# Patient Record
Sex: MALE | Race: BLACK OR AFRICAN AMERICAN | NOT HISPANIC OR LATINO | Employment: FULL TIME | URBAN - METROPOLITAN AREA
[De-identification: names, ages, dates, MRNs, and addresses within clinical notes are randomized per-mention and may not be internally consistent; named-entity substitution may affect disease eponyms.]

---

## 2017-06-23 ENCOUNTER — HOSPITAL ENCOUNTER (EMERGENCY)
Facility: HOSPITAL | Age: 27
Discharge: HOME | End: 2017-06-23
Attending: EMERGENCY MEDICINE
Payer: MEDICAID

## 2017-06-23 VITALS
BODY MASS INDEX: 19.64 KG/M2 | HEART RATE: 71 BPM | HEIGHT: 72 IN | SYSTOLIC BLOOD PRESSURE: 131 MMHG | RESPIRATION RATE: 16 BRPM | DIASTOLIC BLOOD PRESSURE: 74 MMHG | TEMPERATURE: 97 F | OXYGEN SATURATION: 100 % | WEIGHT: 145 LBS

## 2017-06-23 DIAGNOSIS — R11.10 VOMITING: ICD-10-CM

## 2017-06-23 DIAGNOSIS — K21.9 GASTROESOPHAGEAL REFLUX DISEASE, ESOPHAGITIS PRESENCE NOT SPECIFIED: Primary | ICD-10-CM

## 2017-06-23 LAB
ALBUMIN SERPL BCP-MCNC: 4.9 G/DL
ALP SERPL-CCNC: 96 U/L
ALT SERPL W/O P-5'-P-CCNC: 19 U/L
ANION GAP SERPL CALC-SCNC: 12 MMOL/L
AST SERPL-CCNC: 25 U/L
BASOPHILS # BLD AUTO: 0 K/UL
BASOPHILS NFR BLD: 0.3 %
BILIRUB SERPL-MCNC: 1.3 MG/DL
BUN SERPL-MCNC: 17 MG/DL
CALCIUM SERPL-MCNC: 10.4 MG/DL
CHLORIDE SERPL-SCNC: 95 MMOL/L
CO2 SERPL-SCNC: 29 MMOL/L
CREAT SERPL-MCNC: 1 MG/DL
DIFFERENTIAL METHOD: ABNORMAL
EOSINOPHIL # BLD AUTO: 0 K/UL
EOSINOPHIL NFR BLD: 0.7 %
ERYTHROCYTE [DISTWIDTH] IN BLOOD BY AUTOMATED COUNT: 11.9 %
EST. GFR  (AFRICAN AMERICAN): >60 ML/MIN/1.73 M^2
EST. GFR  (NON AFRICAN AMERICAN): >60 ML/MIN/1.73 M^2
GLUCOSE SERPL-MCNC: 97 MG/DL
HCT VFR BLD AUTO: 44 %
HGB BLD-MCNC: 15 G/DL
LIPASE SERPL-CCNC: 13 U/L
LYMPHOCYTES # BLD AUTO: 1.6 K/UL
LYMPHOCYTES NFR BLD: 26.7 %
MCH RBC QN AUTO: 33.9 PG
MCHC RBC AUTO-ENTMCNC: 34.2 %
MCV RBC AUTO: 99 FL
MONOCYTES # BLD AUTO: 0.9 K/UL
MONOCYTES NFR BLD: 14.2 %
NEUTROPHILS # BLD AUTO: 3.6 K/UL
NEUTROPHILS NFR BLD: 58.1 %
PLATELET # BLD AUTO: 188 K/UL
PMV BLD AUTO: 7.3 FL
POTASSIUM SERPL-SCNC: 3.9 MMOL/L
PROT SERPL-MCNC: 9.2 G/DL
RBC # BLD AUTO: 4.43 M/UL
SODIUM SERPL-SCNC: 136 MMOL/L
WBC # BLD AUTO: 6.1 K/UL

## 2017-06-23 PROCEDURE — 83690 ASSAY OF LIPASE: CPT

## 2017-06-23 PROCEDURE — 80053 COMPREHEN METABOLIC PANEL: CPT

## 2017-06-23 PROCEDURE — 25000003 PHARM REV CODE 250: Performed by: PHYSICIAN ASSISTANT

## 2017-06-23 PROCEDURE — 36415 COLL VENOUS BLD VENIPUNCTURE: CPT

## 2017-06-23 PROCEDURE — 99284 EMERGENCY DEPT VISIT MOD MDM: CPT | Mod: 25

## 2017-06-23 PROCEDURE — 85025 COMPLETE CBC W/AUTO DIFF WBC: CPT

## 2017-06-23 PROCEDURE — 96360 HYDRATION IV INFUSION INIT: CPT

## 2017-06-23 PROCEDURE — 93005 ELECTROCARDIOGRAM TRACING: CPT

## 2017-06-23 RX ORDER — ONDANSETRON 4 MG/1
8 TABLET, FILM COATED ORAL 2 TIMES DAILY
COMMUNITY
End: 2018-04-11

## 2017-06-23 RX ORDER — CALCIUM CARBONATE 200(500)MG
500 TABLET,CHEWABLE ORAL
Status: COMPLETED | OUTPATIENT
Start: 2017-06-23 | End: 2017-06-23

## 2017-06-23 RX ORDER — FAMOTIDINE 20 MG/1
20 TABLET, FILM COATED ORAL 2 TIMES DAILY
COMMUNITY
End: 2018-04-11

## 2017-06-23 RX ORDER — ACETAMINOPHEN 325 MG/1
325 TABLET ORAL EVERY 6 HOURS PRN
COMMUNITY
End: 2018-04-11

## 2017-06-23 RX ORDER — BISMUTH SUBSALICYLATE 525 MG/30ML
15 LIQUID ORAL EVERY 6 HOURS PRN
COMMUNITY
End: 2018-04-11

## 2017-06-23 RX ADMIN — CALCIUM CARBONATE (ANTACID) CHEW TAB 500 MG 500 MG: 500 CHEW TAB at 12:06

## 2017-06-23 RX ADMIN — SODIUM CHLORIDE 1000 ML: 0.9 INJECTION, SOLUTION INTRAVENOUS at 11:06

## 2017-06-23 NOTE — ED PROVIDER NOTES
"Encounter Date: 6/23/2017       History     Chief Complaint   Patient presents with    Abdominal Pain     epigastric pain x 4 days     Vomiting    Nausea     Patient is a 26 year old male with complaint of indigestion. He denied PMH. He reports he started vomiting on Monday with associated abdominal pain. He went to [facility] at that time and reports he was given an "IV and some medicine and I felt a little better". He states the vomiting returned on Tuesday and he returned to [facility] and was admitted over night. He states he has had no vomiting since Tuesday but continues to have "indigestion, it feels like it's coming back up when I eat". He reports when he takes pepto-bismol it relieves his symptoms. He denied further abdominal pain, fever, chills or change in bowel habits.       The history is provided by the patient.     Review of patient's allergies indicates:  No Known Allergies  History reviewed. No pertinent past medical history.  History reviewed. No pertinent surgical history.  History reviewed. No pertinent family history.  Social History   Substance Use Topics    Smoking status: Current Some Day Smoker     Types: Cigarettes    Smokeless tobacco: Not on file    Alcohol use No     Review of Systems   Constitutional: Negative for chills and fever.   HENT: Negative for congestion and sore throat.    Respiratory: Negative for cough and shortness of breath.    Cardiovascular: Negative for chest pain.   Gastrointestinal: Positive for vomiting (Resolved). Negative for abdominal pain, diarrhea and nausea.   Genitourinary: Negative for dysuria.   Musculoskeletal: Negative for back pain.   Skin: Negative for rash.   Neurological: Negative for dizziness, weakness and headaches.   Hematological: Does not bruise/bleed easily.       Physical Exam     Initial Vitals [06/23/17 1001]   BP Pulse Resp Temp SpO2   137/79 73 18 97.4 °F (36.3 °C) 97 %      MAP       98.33         Physical Exam    Nursing note and vitals " "reviewed.  Constitutional: He appears well-developed and well-nourished.   HENT:   Head: Normocephalic and atraumatic.   Right Ear: External ear normal.   Left Ear: External ear normal.   Nose: Nose normal.   Eyes: Conjunctivae are normal. Right eye exhibits no discharge. Left eye exhibits no discharge. No scleral icterus.   Neck: Normal range of motion. Neck supple.   Cardiovascular: Normal rate, regular rhythm and normal heart sounds. Exam reveals no gallop and no friction rub.    No murmur heard.  Pulmonary/Chest: Breath sounds normal. He has no wheezes. He has no rhonchi. He has no rales.   Abdominal: Soft. Normal appearance and bowel sounds are normal. He exhibits no distension. There is no tenderness. There is no rigidity, no rebound and no guarding.   Musculoskeletal: Normal range of motion. He exhibits no tenderness.   Neurological: He is oriented to person, place, and time.   Skin: Skin is warm and dry.         ED Course   Procedures  Labs Reviewed   CBC W/ AUTO DIFFERENTIAL - Abnormal; Notable for the following:        Result Value    RBC 4.43 (*)     MCV 99 (*)     MCH 33.9 (*)     MPV 7.3 (*)     All other components within normal limits   COMPREHENSIVE METABOLIC PANEL - Abnormal; Notable for the following:     Total Protein 9.2 (*)     Total Bilirubin 1.3 (*)     All other components within normal limits   LIPASE             Medical Decision Making:   History:   Old Medical Records: I decided to obtain old medical records.  Clinical Tests:   Lab Tests: Ordered  Medical Tests: Ordered       APC / Resident Notes:   This is an emergent evaluation of a 26 year old male with complaint of "indigestion". This is his third ED visit in one week with a reported recent admission at [facility]. He reports his vomiting has resolved. He is well appearing. Vital signs are stable. Moist mucous membranes. His abdomen is soft and non-tender. There is no rebound or guarding. I doubt acute intra-abdominal process. He reports " pepto-bismol relieves his symptoms and his HPI is consistent with GERD. Labs are unremarkable. Lipase is negative. He was given IVF hydration and TUMS with resolution of his symptoms. He is instructed to continue his Prilosec as prescribed. Strongly encouraged patient to follow up with GI. Discussed results with patient. Return precautions given. Patient is to follow up with their primary care provider. Case was discussed with Dr. Medeiros who has evaluated the patient and is in agreement with the plan of care. All questions answered.                 ED Course   Comment By Time   EKG:  NSR, rate of 66, normal intervals and axis.  There are no acute ST or T wave changes suggestive of acute ischemia or infarction. Anthony Medeiros MD 06/23 3642     Clinical Impression:   The primary encounter diagnosis was Gastroesophageal reflux disease, esophagitis presence not specified. A diagnosis of Vomiting was also pertinent to this visit.                           Jackie Pink PA-C  06/23/17 0638

## 2017-06-23 NOTE — DISCHARGE INSTRUCTIONS
Continue Prilosec as prescribed.  Make an appointment with your primary care provider and stomach doctor.  For worsening symptoms, chest pain, shortness of breath, increased abdominal pain, high grade fever, stroke or stroke like symptoms, immediately go to the nearest Emergency Room or call 911 as soon as possible.

## 2018-05-09 ENCOUNTER — HOSPITAL ENCOUNTER (EMERGENCY)
Facility: HOSPITAL | Age: 28
Discharge: HOME | End: 2018-05-09
Attending: EMERGENCY MEDICINE
Payer: MEDICAID

## 2018-05-09 VITALS
HEIGHT: 73 IN | OXYGEN SATURATION: 98 % | HEART RATE: 86 BPM | SYSTOLIC BLOOD PRESSURE: 128 MMHG | TEMPERATURE: 98 F | DIASTOLIC BLOOD PRESSURE: 76 MMHG | RESPIRATION RATE: 16 BRPM | BODY MASS INDEX: 19.22 KG/M2 | WEIGHT: 145 LBS

## 2018-05-09 DIAGNOSIS — E86.0 DEHYDRATION: Primary | ICD-10-CM

## 2018-05-09 DIAGNOSIS — R11.2 NON-INTRACTABLE VOMITING WITH NAUSEA, UNSPECIFIED VOMITING TYPE: ICD-10-CM

## 2018-05-09 DIAGNOSIS — N17.9 AKI (ACUTE KIDNEY INJURY): ICD-10-CM

## 2018-05-09 LAB
ALBUMIN SERPL BCP-MCNC: 5.5 G/DL
ALP SERPL-CCNC: 149 U/L
ALT SERPL W/O P-5'-P-CCNC: 26 U/L
ANION GAP SERPL CALC-SCNC: 17 MMOL/L
AST SERPL-CCNC: 22 U/L
BASOPHILS # BLD AUTO: 0 K/UL
BASOPHILS NFR BLD: 0.1 %
BILIRUB SERPL-MCNC: 0.8 MG/DL
BUN SERPL-MCNC: 64 MG/DL
CALCIUM SERPL-MCNC: 10.5 MG/DL
CHLORIDE SERPL-SCNC: 91 MMOL/L
CO2 SERPL-SCNC: 22 MMOL/L
CREAT SERPL-MCNC: 2 MG/DL
DEPRECATED S PYO AG THROAT QL EIA: NEGATIVE
DIFFERENTIAL METHOD: ABNORMAL
EOSINOPHIL # BLD AUTO: 0 K/UL
EOSINOPHIL NFR BLD: 0.2 %
ERYTHROCYTE [DISTWIDTH] IN BLOOD BY AUTOMATED COUNT: 12.7 %
EST. GFR  (AFRICAN AMERICAN): 52 ML/MIN/1.73 M^2
EST. GFR  (NON AFRICAN AMERICAN): 45 ML/MIN/1.73 M^2
GLUCOSE SERPL-MCNC: 105 MG/DL
HCT VFR BLD AUTO: 50.6 %
HETEROPH AB SERPL QL IA: NEGATIVE
HGB BLD-MCNC: 17.3 G/DL
HIV1+2 IGG SERPL QL IA.RAPID: NEGATIVE
LYMPHOCYTES # BLD AUTO: 1.3 K/UL
LYMPHOCYTES NFR BLD: 20.1 %
MAGNESIUM SERPL-MCNC: 2.9 MG/DL
MCH RBC QN AUTO: 33.6 PG
MCHC RBC AUTO-ENTMCNC: 34.3 G/DL
MCV RBC AUTO: 98 FL
MONOCYTES # BLD AUTO: 0.7 K/UL
MONOCYTES NFR BLD: 10 %
NEUTROPHILS # BLD AUTO: 4.6 K/UL
NEUTROPHILS NFR BLD: 69.6 %
PHOSPHATE SERPL-MCNC: 7.3 MG/DL
PLATELET # BLD AUTO: 213 K/UL
PMV BLD AUTO: 7.6 FL
POTASSIUM SERPL-SCNC: 4.2 MMOL/L
PROT SERPL-MCNC: 10.2 G/DL
RBC # BLD AUTO: 5.15 M/UL
SODIUM SERPL-SCNC: 130 MMOL/L
WBC # BLD AUTO: 6.6 K/UL

## 2018-05-09 PROCEDURE — 25000003 PHARM REV CODE 250: Performed by: EMERGENCY MEDICINE

## 2018-05-09 PROCEDURE — 83735 ASSAY OF MAGNESIUM: CPT

## 2018-05-09 PROCEDURE — 80053 COMPREHEN METABOLIC PANEL: CPT

## 2018-05-09 PROCEDURE — 86308 HETEROPHILE ANTIBODY SCREEN: CPT

## 2018-05-09 PROCEDURE — 96361 HYDRATE IV INFUSION ADD-ON: CPT

## 2018-05-09 PROCEDURE — 87081 CULTURE SCREEN ONLY: CPT

## 2018-05-09 PROCEDURE — 87880 STREP A ASSAY W/OPTIC: CPT

## 2018-05-09 PROCEDURE — 85025 COMPLETE CBC W/AUTO DIFF WBC: CPT

## 2018-05-09 PROCEDURE — 84100 ASSAY OF PHOSPHORUS: CPT

## 2018-05-09 PROCEDURE — 99284 EMERGENCY DEPT VISIT MOD MDM: CPT | Mod: 25

## 2018-05-09 PROCEDURE — 86703 HIV-1/HIV-2 1 RESULT ANTBDY: CPT

## 2018-05-09 PROCEDURE — 36415 COLL VENOUS BLD VENIPUNCTURE: CPT

## 2018-05-09 PROCEDURE — 63600175 PHARM REV CODE 636 W HCPCS: Performed by: EMERGENCY MEDICINE

## 2018-05-09 PROCEDURE — 96374 THER/PROPH/DIAG INJ IV PUSH: CPT

## 2018-05-09 RX ORDER — ACETAMINOPHEN 325 MG/1
650 TABLET ORAL
Status: COMPLETED | OUTPATIENT
Start: 2018-05-09 | End: 2018-05-09

## 2018-05-09 RX ORDER — OMEPRAZOLE 20 MG/1
20 CAPSULE, DELAYED RELEASE ORAL DAILY
Qty: 20 CAPSULE | Refills: 0 | Status: SHIPPED | OUTPATIENT
Start: 2018-05-09 | End: 2019-08-02

## 2018-05-09 RX ORDER — ONDANSETRON 2 MG/ML
4 INJECTION INTRAMUSCULAR; INTRAVENOUS
Status: COMPLETED | OUTPATIENT
Start: 2018-05-09 | End: 2018-05-09

## 2018-05-09 RX ORDER — ONDANSETRON 4 MG/1
4 TABLET, ORALLY DISINTEGRATING ORAL EVERY 6 HOURS PRN
Qty: 12 TABLET | Refills: 0 | Status: ON HOLD | OUTPATIENT
Start: 2018-05-09 | End: 2019-07-13 | Stop reason: SDUPTHER

## 2018-05-09 RX ADMIN — LIDOCAINE HYDROCHLORIDE: 20 SOLUTION ORAL; TOPICAL at 02:05

## 2018-05-09 RX ADMIN — SODIUM CHLORIDE 1000 ML: 0.9 INJECTION, SOLUTION INTRAVENOUS at 02:05

## 2018-05-09 RX ADMIN — ONDANSETRON 4 MG: 2 INJECTION INTRAMUSCULAR; INTRAVENOUS at 01:05

## 2018-05-09 RX ADMIN — SODIUM CHLORIDE 1000 ML: 0.9 INJECTION, SOLUTION INTRAVENOUS at 01:05

## 2018-05-09 RX ADMIN — ACETAMINOPHEN 650 MG: 325 TABLET, FILM COATED ORAL at 01:05

## 2018-05-09 NOTE — ED PROVIDER NOTES
Encounter Date: 5/9/2018    SCRIBE #1 NOTE: ISmitha, am scribing for, and in the presence of, Dr. Dixon.       History     Chief Complaint   Patient presents with    Fatigue     x 1 week     Laryngitis    Generalized Body Aches     5/9/2018  12:15 PM     Chief Complaint: Sore throat    The patient is a 27 y.o. male with PMHx of GERD who presents with sore throat. Patient c/o sudden onset of store throat that started yesterday. He also reports fatigue, generalized body aches, alternating chills and subjective fever. Pt had vomiting a few days ago and is currently nauseated. He denies any fevers, diarrhea or abdominal pain. Denies any IV drug usage. He is sexually active with one female partner but does not use any contraceptives. The patient recently traveled to [place] and reports his symptoms started soon after. No shx.      The history is provided by the patient.     Review of patient's allergies indicates:  No Known Allergies  Past Medical History:   Diagnosis Date    GERD (gastroesophageal reflux disease)      History reviewed. No pertinent surgical history.  History reviewed. No pertinent family history.  Social History   Substance Use Topics    Smoking status: Current Every Day Smoker    Smokeless tobacco: Not on file    Alcohol use No     Review of Systems   Constitutional: Positive for chills, fatigue and fever (subjective). Negative for appetite change.   HENT: Positive for sore throat. Negative for congestion and rhinorrhea.    Respiratory: Negative for cough and shortness of breath.    Cardiovascular: Negative for chest pain.   Gastrointestinal: Negative for abdominal pain, diarrhea, nausea and vomiting.   Genitourinary: Negative for dysuria.   Musculoskeletal: Positive for myalgias. Negative for back pain.   Skin: Negative for rash.   Neurological: Negative for weakness and numbness.   Hematological: Does not bruise/bleed easily.   All other systems reviewed and are  negative.      Physical Exam     Initial Vitals [05/09/18 1110]   BP Pulse Resp Temp SpO2   (!) 140/87 103 18 98.4 °F (36.9 °C) 99 %      MAP       104.67         Physical Exam    Nursing note and vitals reviewed.  Constitutional: He is diaphoretic. He appears ill. No distress.   Thin, ill appearing.    HENT:   Head: Normocephalic and atraumatic.   Mouth/Throat: Uvula is midline. Mucous membranes are dry. Posterior oropharyngeal erythema present.   Cobblestone of the posterior oropharynx with erythema. No tonsillar enlargement. No tonsillar exudate.   Eyes: EOM are normal. Pupils are equal, round, and reactive to light.   Neck: Normal range of motion.   Cardiovascular: Regular rhythm, normal heart sounds, intact distal pulses and normal pulses. Tachycardia present.  Exam reveals no gallop and no friction rub.    No murmur heard.  Pulses:       Radial pulses are 2+ on the right side, and 2+ on the left side.   Mildly tachycardiac.   Pulmonary/Chest: No stridor. He has no wheezes. He has rhonchi. He has no rales.   Rhonchi to the left lung.   Abdominal: Soft. He exhibits no distension. There is no tenderness.   Musculoskeletal: Normal range of motion. He exhibits no edema.   Neurological: He is alert and oriented to person, place, and time. He has normal strength.   Skin: No rash noted.   Psychiatric: He has a normal mood and affect.         ED Course   Procedures  Labs Reviewed   CBC W/ AUTO DIFFERENTIAL - Abnormal; Notable for the following:        Result Value    MCH 33.6 (*)     MPV 7.6 (*)     All other components within normal limits   COMPREHENSIVE METABOLIC PANEL - Abnormal; Notable for the following:     Sodium 130 (*)     Chloride 91 (*)     CO2 22 (*)     BUN, Bld 64 (*)     Creatinine 2.0 (*)     Total Protein 10.2 (*)     Albumin 5.5 (*)     Alkaline Phosphatase 149 (*)     Anion Gap 17 (*)     eGFR if  52 (*)     eGFR if non  45 (*)     All other components within normal  limits   MAGNESIUM - Abnormal; Notable for the following:     Magnesium 2.9 (*)     All other components within normal limits   PHOSPHORUS - Abnormal; Notable for the following:     Phosphorus 7.3 (*)     All other components within normal limits   THROAT SCREEN, RAPID   CULTURE, STREP A,  THROAT   HETEROPHILE AB SCREEN   RAPID HIV     Imaging Results          X-Ray Chest PA And Lateral (Final result)  Result time 05/09/18 13:01:37    Final result by Danny Slater MD (05/09/18 13:01:37)                 Impression:      Negative chest.      Electronically signed by: Danny Slater MD  Date:    05/09/2018  Time:    13:01             Narrative:    EXAMINATION:  XR CHEST PA AND LATERAL    CLINICAL HISTORY:  fatigue;    TECHNIQUE:  PA and lateral views of the chest were performed.    COMPARISON:  None    FINDINGS:  The cardiomediastinal silhouette is with normal limits.  Lungs are well expanded without consolidation or pleural effusion.                                          Medical Decision Making:   History:   Old Medical Records: I decided to obtain old medical records.  Initial Assessment:   Patient is a 27-year-old male who presents emergency department complaining nausea vomiting, sore throat and fatigue.  He has had these symptoms in the past and has to get IV fluids from time to time.  Sometimes he manages at home.  He denies any recent sick contacts.  His mucous membrane is dry. Laboratory evaluation reveals evidence of dehydration with acute kidney injury. GFR decreased from greater than 60-52 and his creatinine went from 1-2 with elevated BUN of 64.  There is hyponatremia with hypochloremia.  Chest x-ray shows no evidence of pneumonia.  Rapid HIV, Monospot and strep test were negative. Neal sore throat is likely secondary to his vomiting and his fatigue is secondary to his dehydration.  He is given antiemetics, 2 L of IV fluids and GI cocktail with significant improvement.  Patient feels well  enough to go home.  I will discharge him on antiemetics and diet suggestions.  He has no leukocytosis or fever to suspect bacterial infectious etiology.  Do not believe he needs antibiotics.  I have low suspicion for acute diverticulitis, acute pancreatitis, acute cholecystitis.  Return precautions were discussed.  Encouraged to follow up with PCP in 1 week.  He is discharged improved and in no acute distress.  Clinical Tests:   Lab Tests: Ordered and Reviewed  Radiological Study: Ordered and Reviewed            Scribe Attestation:   Scribe #1: I performed the above scribed service and the documentation accurately describes the services I performed. I attest to the accuracy of the note.      I, Zack River, personally performed the services described in this documentation. All medical record entries made by the scribe were at my direction and in my presence.  I have reviewed the chart and agree that the record reflects my personal performance and is accurate and complete. Leonel Dixon MD.  4:09 PM 05/09/2018               Clinical Impression:   The primary encounter diagnosis was Dehydration. Diagnoses of Non-intractable vomiting with nausea, unspecified vomiting type and SEMAJ (acute kidney injury) were also pertinent to this visit.    Disposition:   Disposition: Discharged  Condition: Stable                        Leonel Dixon MD  05/09/18 9670

## 2018-05-12 LAB — BACTERIA THROAT CULT: NORMAL

## 2019-07-12 ENCOUNTER — HOSPITAL ENCOUNTER (OUTPATIENT)
Facility: HOSPITAL | Age: 29
Discharge: HOME | End: 2019-07-13
Attending: EMERGENCY MEDICINE | Admitting: INTERNAL MEDICINE
Payer: MEDICAID

## 2019-07-12 DIAGNOSIS — E86.0 DEHYDRATION: ICD-10-CM

## 2019-07-12 DIAGNOSIS — N17.9 AKI (ACUTE KIDNEY INJURY): Primary | ICD-10-CM

## 2019-07-12 DIAGNOSIS — N30.01 ACUTE CYSTITIS WITH HEMATURIA: ICD-10-CM

## 2019-07-12 DIAGNOSIS — K52.9 GASTROENTERITIS: ICD-10-CM

## 2019-07-12 LAB
BASOPHILS # BLD AUTO: 0.04 K/UL (ref 0–0.2)
BASOPHILS NFR BLD: 0.2 % (ref 0–1.9)
DIFFERENTIAL METHOD: ABNORMAL
EOSINOPHIL # BLD AUTO: 0 K/UL (ref 0–0.5)
EOSINOPHIL NFR BLD: 0 % (ref 0–8)
ERYTHROCYTE [DISTWIDTH] IN BLOOD BY AUTOMATED COUNT: 11.5 % (ref 11.5–14.5)
HCT VFR BLD AUTO: 44.4 % (ref 40–54)
HGB BLD-MCNC: 15 G/DL (ref 14–18)
IMM GRANULOCYTES # BLD AUTO: 0.08 K/UL (ref 0–0.04)
LYMPHOCYTES # BLD AUTO: 0.9 K/UL (ref 1–4.8)
LYMPHOCYTES NFR BLD: 5 % (ref 18–48)
MCH RBC QN AUTO: 32.6 PG (ref 27–31)
MCHC RBC AUTO-ENTMCNC: 33.8 G/DL (ref 32–36)
MCV RBC AUTO: 97 FL (ref 82–98)
MONOCYTES # BLD AUTO: 0.7 K/UL (ref 0.3–1)
MONOCYTES NFR BLD: 3.8 % (ref 4–15)
NEUTROPHILS # BLD AUTO: 16.2 K/UL (ref 1.8–7.7)
NEUTROPHILS NFR BLD: 90.6 % (ref 38–73)
NRBC BLD-RTO: 0 /100 WBC
PLATELET # BLD AUTO: 217 K/UL (ref 150–350)
PMV BLD AUTO: 9.6 FL (ref 9.2–12.9)
RBC # BLD AUTO: 4.6 M/UL (ref 4.6–6.2)
WBC # BLD AUTO: 17.92 K/UL (ref 3.9–12.7)

## 2019-07-12 PROCEDURE — 85025 COMPLETE CBC W/AUTO DIFF WBC: CPT

## 2019-07-12 PROCEDURE — 99285 EMERGENCY DEPT VISIT HI MDM: CPT | Mod: 25

## 2019-07-12 PROCEDURE — 25000003 PHARM REV CODE 250: Performed by: NURSE PRACTITIONER

## 2019-07-12 PROCEDURE — 83690 ASSAY OF LIPASE: CPT

## 2019-07-12 PROCEDURE — 96361 HYDRATE IV INFUSION ADD-ON: CPT

## 2019-07-12 PROCEDURE — 80053 COMPREHEN METABOLIC PANEL: CPT

## 2019-07-12 RX ORDER — DICYCLOMINE HYDROCHLORIDE 10 MG/1
20 CAPSULE ORAL
Status: COMPLETED | OUTPATIENT
Start: 2019-07-12 | End: 2019-07-12

## 2019-07-12 RX ORDER — SODIUM CHLORIDE 9 MG/ML
1000 INJECTION, SOLUTION INTRAVENOUS
Status: COMPLETED | OUTPATIENT
Start: 2019-07-12 | End: 2019-07-13

## 2019-07-12 RX ORDER — ONDANSETRON HYDROCHLORIDE 4 MG/5ML
4 SOLUTION ORAL ONCE
Status: COMPLETED | OUTPATIENT
Start: 2019-07-13 | End: 2019-07-12

## 2019-07-12 RX ADMIN — ONDANSETRON 4 MG: 4 SOLUTION ORAL at 11:07

## 2019-07-12 RX ADMIN — SODIUM CHLORIDE 1000 ML: 0.9 INJECTION, SOLUTION INTRAVENOUS at 11:07

## 2019-07-12 RX ADMIN — DICYCLOMINE HYDROCHLORIDE 20 MG: 10 CAPSULE ORAL at 11:07

## 2019-07-13 VITALS
RESPIRATION RATE: 18 BRPM | TEMPERATURE: 99 F | HEIGHT: 73 IN | WEIGHT: 145 LBS | SYSTOLIC BLOOD PRESSURE: 154 MMHG | HEART RATE: 56 BPM | DIASTOLIC BLOOD PRESSURE: 93 MMHG | BODY MASS INDEX: 19.22 KG/M2 | OXYGEN SATURATION: 99 %

## 2019-07-13 PROBLEM — N17.9 AKI (ACUTE KIDNEY INJURY): Status: ACTIVE | Noted: 2019-07-13

## 2019-07-13 PROBLEM — E86.0 DEHYDRATION: Status: ACTIVE | Noted: 2019-07-13

## 2019-07-13 PROBLEM — N39.0 UTI (URINARY TRACT INFECTION): Status: ACTIVE | Noted: 2019-07-13

## 2019-07-13 PROBLEM — Z72.0 TOBACCO ABUSE: Status: ACTIVE | Noted: 2019-07-13

## 2019-07-13 PROBLEM — K52.9 GASTROENTERITIS: Status: ACTIVE | Noted: 2019-07-13

## 2019-07-13 PROBLEM — R65.20 SEVERE SEPSIS: Status: ACTIVE | Noted: 2019-07-13

## 2019-07-13 PROBLEM — A41.9 SEVERE SEPSIS: Status: ACTIVE | Noted: 2019-07-13

## 2019-07-13 LAB
ALBUMIN SERPL BCP-MCNC: 5.9 G/DL (ref 3.5–5.2)
ALP SERPL-CCNC: 140 U/L (ref 55–135)
ALT SERPL W/O P-5'-P-CCNC: 42 U/L (ref 10–44)
ANION GAP SERPL CALC-SCNC: 13 MMOL/L (ref 8–16)
ANION GAP SERPL CALC-SCNC: 20 MMOL/L (ref 8–16)
AST SERPL-CCNC: 40 U/L (ref 10–40)
BACTERIA #/AREA URNS HPF: ABNORMAL /HPF
BASOPHILS # BLD AUTO: 0.02 K/UL (ref 0–0.2)
BASOPHILS NFR BLD: 0.1 % (ref 0–1.9)
BILIRUB SERPL-MCNC: 0.5 MG/DL (ref 0.1–1)
BILIRUB UR QL STRIP: ABNORMAL
BUN SERPL-MCNC: 14 MG/DL (ref 6–20)
BUN SERPL-MCNC: 16 MG/DL (ref 6–20)
C TRACH DNA SPEC QL NAA+PROBE: NOT DETECTED
CALCIUM SERPL-MCNC: 10.1 MG/DL (ref 8.7–10.5)
CALCIUM SERPL-MCNC: 11.4 MG/DL (ref 8.7–10.5)
CHLORIDE SERPL-SCNC: 101 MMOL/L (ref 95–110)
CHLORIDE SERPL-SCNC: 99 MMOL/L (ref 95–110)
CLARITY UR: ABNORMAL
CO2 SERPL-SCNC: 22 MMOL/L (ref 23–29)
CO2 SERPL-SCNC: 25 MMOL/L (ref 23–29)
COLOR UR: ABNORMAL
CREAT SERPL-MCNC: 1.1 MG/DL (ref 0.5–1.4)
CREAT SERPL-MCNC: 1.9 MG/DL (ref 0.5–1.4)
DIFFERENTIAL METHOD: ABNORMAL
EOSINOPHIL # BLD AUTO: 0 K/UL (ref 0–0.5)
EOSINOPHIL NFR BLD: 0 % (ref 0–8)
ERYTHROCYTE [DISTWIDTH] IN BLOOD BY AUTOMATED COUNT: 11.5 % (ref 11.5–14.5)
EST. GFR  (AFRICAN AMERICAN): 55 ML/MIN/1.73 M^2
EST. GFR  (AFRICAN AMERICAN): >60 ML/MIN/1.73 M^2
EST. GFR  (NON AFRICAN AMERICAN): 47 ML/MIN/1.73 M^2
EST. GFR  (NON AFRICAN AMERICAN): >60 ML/MIN/1.73 M^2
GLUCOSE SERPL-MCNC: 109 MG/DL (ref 70–110)
GLUCOSE SERPL-MCNC: 159 MG/DL (ref 70–110)
GLUCOSE UR QL STRIP: NEGATIVE
HCT VFR BLD AUTO: 36.7 % (ref 40–54)
HGB BLD-MCNC: 12.5 G/DL (ref 14–18)
HGB UR QL STRIP: ABNORMAL
HYALINE CASTS #/AREA URNS LPF: 17 /LPF
IMM GRANULOCYTES # BLD AUTO: 0.1 K/UL (ref 0–0.04)
KETONES UR QL STRIP: ABNORMAL
LACTATE SERPL-SCNC: 1.5 MMOL/L (ref 0.5–2.2)
LACTATE SERPL-SCNC: 3 MMOL/L (ref 0.5–2.2)
LEUKOCYTE ESTERASE UR QL STRIP: ABNORMAL
LIPASE SERPL-CCNC: 19 U/L (ref 4–60)
LYMPHOCYTES # BLD AUTO: 1.8 K/UL (ref 1–4.8)
LYMPHOCYTES NFR BLD: 10.3 % (ref 18–48)
MCH RBC QN AUTO: 32.8 PG (ref 27–31)
MCHC RBC AUTO-ENTMCNC: 34.1 G/DL (ref 32–36)
MCV RBC AUTO: 96 FL (ref 82–98)
MICROSCOPIC COMMENT: ABNORMAL
MONOCYTES # BLD AUTO: 1.4 K/UL (ref 0.3–1)
MONOCYTES NFR BLD: 8.2 % (ref 4–15)
N GONORRHOEA DNA SPEC QL NAA+PROBE: NOT DETECTED
NEUTROPHILS # BLD AUTO: 14.1 K/UL (ref 1.8–7.7)
NEUTROPHILS NFR BLD: 80.8 % (ref 38–73)
NITRITE UR QL STRIP: POSITIVE
NRBC BLD-RTO: 0 /100 WBC
PH UR STRIP: 6 [PH] (ref 5–8)
PLATELET # BLD AUTO: 197 K/UL (ref 150–350)
PMV BLD AUTO: 9.3 FL (ref 9.2–12.9)
POTASSIUM SERPL-SCNC: 4.1 MMOL/L (ref 3.5–5.1)
POTASSIUM SERPL-SCNC: 4.3 MMOL/L (ref 3.5–5.1)
PROT SERPL-MCNC: 10.9 G/DL (ref 6–8.4)
PROT UR QL STRIP: ABNORMAL
RBC # BLD AUTO: 3.81 M/UL (ref 4.6–6.2)
RBC #/AREA URNS HPF: 4 /HPF (ref 0–4)
SODIUM SERPL-SCNC: 139 MMOL/L (ref 136–145)
SODIUM SERPL-SCNC: 141 MMOL/L (ref 136–145)
SP GR UR STRIP: >=1.03 (ref 1–1.03)
SQUAMOUS #/AREA URNS HPF: 7 /HPF
URN SPEC COLLECT METH UR: ABNORMAL
UROBILINOGEN UR STRIP-ACNC: 1 EU/DL
WBC # BLD AUTO: 17.47 K/UL (ref 3.9–12.7)
WBC #/AREA URNS HPF: 73 /HPF (ref 0–5)

## 2019-07-13 PROCEDURE — G0378 HOSPITAL OBSERVATION PER HR: HCPCS

## 2019-07-13 PROCEDURE — 81000 URINALYSIS NONAUTO W/SCOPE: CPT

## 2019-07-13 PROCEDURE — 87491 CHLMYD TRACH DNA AMP PROBE: CPT

## 2019-07-13 PROCEDURE — 96376 TX/PRO/DX INJ SAME DRUG ADON: CPT

## 2019-07-13 PROCEDURE — 25000003 PHARM REV CODE 250: Performed by: NURSE PRACTITIONER

## 2019-07-13 PROCEDURE — 96365 THER/PROPH/DIAG IV INF INIT: CPT

## 2019-07-13 PROCEDURE — 83605 ASSAY OF LACTIC ACID: CPT | Mod: 91

## 2019-07-13 PROCEDURE — 87086 URINE CULTURE/COLONY COUNT: CPT

## 2019-07-13 PROCEDURE — 87040 BLOOD CULTURE FOR BACTERIA: CPT

## 2019-07-13 PROCEDURE — 83605 ASSAY OF LACTIC ACID: CPT

## 2019-07-13 PROCEDURE — 36415 COLL VENOUS BLD VENIPUNCTURE: CPT

## 2019-07-13 PROCEDURE — 63600175 PHARM REV CODE 636 W HCPCS: Performed by: NURSE PRACTITIONER

## 2019-07-13 PROCEDURE — 96375 TX/PRO/DX INJ NEW DRUG ADDON: CPT

## 2019-07-13 PROCEDURE — 80048 BASIC METABOLIC PNL TOTAL CA: CPT

## 2019-07-13 PROCEDURE — 85025 COMPLETE CBC W/AUTO DIFF WBC: CPT

## 2019-07-13 RX ORDER — SODIUM CHLORIDE 9 MG/ML
1000 INJECTION, SOLUTION INTRAVENOUS
Status: COMPLETED | OUTPATIENT
Start: 2019-07-13 | End: 2019-07-13

## 2019-07-13 RX ORDER — CIPROFLOXACIN 500 MG/1
500 TABLET ORAL EVERY 12 HOURS
Qty: 14 TABLET | Refills: 0 | Status: SHIPPED | OUTPATIENT
Start: 2019-07-13 | End: 2019-07-20

## 2019-07-13 RX ORDER — ONDANSETRON 2 MG/ML
4 INJECTION INTRAMUSCULAR; INTRAVENOUS
Status: COMPLETED | OUTPATIENT
Start: 2019-07-13 | End: 2019-07-13

## 2019-07-13 RX ORDER — SODIUM CHLORIDE 9 MG/ML
INJECTION, SOLUTION INTRAVENOUS CONTINUOUS
Status: DISCONTINUED | OUTPATIENT
Start: 2019-07-13 | End: 2019-07-13 | Stop reason: HOSPADM

## 2019-07-13 RX ORDER — PANTOPRAZOLE SODIUM 40 MG/1
40 TABLET, DELAYED RELEASE ORAL DAILY
Status: DISCONTINUED | OUTPATIENT
Start: 2019-07-13 | End: 2019-07-13 | Stop reason: HOSPADM

## 2019-07-13 RX ORDER — ONDANSETRON 4 MG/1
8 TABLET, ORALLY DISINTEGRATING ORAL EVERY 8 HOURS PRN
Qty: 12 TABLET | Refills: 0 | Status: SHIPPED | OUTPATIENT
Start: 2019-07-13 | End: 2019-07-28

## 2019-07-13 RX ADMIN — PANTOPRAZOLE SODIUM 40 MG: 40 TABLET, DELAYED RELEASE ORAL at 11:07

## 2019-07-13 RX ADMIN — ONDANSETRON 4 MG: 2 INJECTION INTRAMUSCULAR; INTRAVENOUS at 12:07

## 2019-07-13 RX ADMIN — SODIUM CHLORIDE: 0.9 INJECTION, SOLUTION INTRAVENOUS at 09:07

## 2019-07-13 RX ADMIN — PROMETHAZINE HYDROCHLORIDE 12.5 MG: 25 INJECTION INTRAMUSCULAR; INTRAVENOUS at 01:07

## 2019-07-13 RX ADMIN — CEFTRIAXONE 2 G: 2 INJECTION, SOLUTION INTRAVENOUS at 01:07

## 2019-07-13 RX ADMIN — SODIUM CHLORIDE 1000 ML: 0.9 INJECTION, SOLUTION INTRAVENOUS at 12:07

## 2019-07-13 RX ADMIN — PROMETHAZINE HYDROCHLORIDE 12.5 MG: 25 INJECTION INTRAMUSCULAR; INTRAVENOUS at 09:07

## 2019-07-13 RX ADMIN — CEFTRIAXONE 1 G: 1 INJECTION, SOLUTION INTRAVENOUS at 01:07

## 2019-07-13 NOTE — H&P
"[facility] Hospital Medicine  History & Physical    Patient Name: Ino Perez  MRN: [id number]  Admission Date: 7/12/2019  Attending Physician: Yvonne Menjivar MD  Primary Care Provider: Sharonda Oswald NP         Patient information was obtained from patient, past medical records and ER records.     Subjective:     Principal Problem:SEMAJ (acute kidney injury)    Chief Complaint:   Chief Complaint   Patient presents with    Emesis     started at 10 am with nausea        HPI: Ino Perez is a 27 yo male with no significant past medical history.  He presented to the ED with a 1 day onset of nausea and vomiting associated with abdominal cramping and difficulty urinating.  He believes this is attributed to something that he ate that is spicy, which has upset his stomach. He stated that he is having pressure with trying to urinate and it "hurts too much to go".  He denied fever, chest pain, SOB, diarrhea, dysuria, and any other symptoms at this time.  Patient has been bolused with 2 L of fluids in the ED and treated with antiemetics.  Laboratory work revealed in SEMAJ and nitrite positive UTI..  Patient will be admitted to Hospital Medicine for further evaluation.    Past Medical History:   Diagnosis Date    GERD (gastroesophageal reflux disease)        History reviewed. No pertinent surgical history.    Review of patient's allergies indicates:   Allergen Reactions    Pcn [penicillins]        No current facility-administered medications on file prior to encounter.      Current Outpatient Medications on File Prior to Encounter   Medication Sig    omeprazole (PRILOSEC) 20 MG capsule Take 1 capsule (20 mg total) by mouth once daily.    ondansetron (ZOFRAN-ODT) 4 MG TbDL Take 1 tablet (4 mg total) by mouth every 6 (six) hours as needed (nausea).     Family History     Problem Relation (Age of Onset)    No Known Problems Mother, Father        Tobacco Use    Smoking status: Current " Every Day Smoker   Substance and Sexual Activity    Alcohol use: No    Drug use: Yes     Types: Marijuana    Sexual activity: Not on file     Review of Systems   Constitutional: Negative for activity change, appetite change, chills, fatigue and fever.   HENT: Negative for congestion, hearing loss, sore throat and trouble swallowing.    Eyes: Negative for photophobia and visual disturbance.   Respiratory: Negative for cough, shortness of breath and wheezing.    Cardiovascular: Negative for chest pain, palpitations and leg swelling.   Gastrointestinal: Positive for abdominal pain (cramping/soreness post vomiting), nausea and vomiting. Negative for diarrhea.   Endocrine: Negative for polydipsia, polyphagia and polyuria.   Genitourinary: Positive for difficulty urinating and dysuria. Negative for frequency and urgency.   Musculoskeletal: Negative for neck pain and neck stiffness.   Skin: Negative for rash and wound.   Neurological: Negative for dizziness, weakness, numbness and headaches.   Psychiatric/Behavioral: Negative for confusion. The patient is not nervous/anxious.      Objective:     Vital Signs (Most Recent):  Temp: 98.5 °F (36.9 °C) (07/12/19 2316)  Pulse: (!) 57 (07/13/19 0101)  Resp: 20 (07/12/19 2316)  BP: 139/68 (07/13/19 0101)  SpO2: 95 % (07/13/19 0101) Vital Signs (24h Range):  Temp:  [98.5 °F (36.9 °C)] 98.5 °F (36.9 °C)  Pulse:  [54-99] 57  Resp:  [20] 20  SpO2:  [95 %-100 %] 95 %  BP: (135-139)/(67-82) 139/68     Weight: 65.8 kg (145 lb)  Body mass index is 19.13 kg/m².    Physical Exam   Constitutional: He is oriented to person, place, and time. He appears well-developed and well-nourished. No distress.   HENT:   Head: Normocephalic and atraumatic.   Mucous membranes dry.   Eyes: Pupils are equal, round, and reactive to light. Conjunctivae and EOM are normal.   Neck: Normal range of motion. Neck supple. No tracheal deviation present. No thyromegaly present.   Cardiovascular: Normal rate, regular  rhythm, normal heart sounds and intact distal pulses. Exam reveals no gallop and no friction rub.   No murmur heard.  Pulses:       Dorsalis pedis pulses are 2+ on the right side, and 2+ on the left side.   Pulmonary/Chest: Effort normal and breath sounds normal. No accessory muscle usage. No respiratory distress. He has no wheezes. He has no rhonchi. He has no rales.   Abdominal: Soft. Bowel sounds are normal. He exhibits no distension and no mass. There is no tenderness.   Musculoskeletal: Normal range of motion. He exhibits no edema, tenderness or deformity.   Neurological: He is alert and oriented to person, place, and time. He has normal strength. Coordination normal.   Skin: Skin is warm, dry and intact. Capillary refill takes less than 2 seconds. No rash noted. He is not diaphoretic. No erythema.   Psychiatric: He has a normal mood and affect. His speech is normal and behavior is normal.   Vitals reviewed.        CRANIAL NERVES     CN III, IV, VI   Pupils are equal, round, and reactive to light.  Extraocular motions are normal.        Significant Labs:   CBC:   Recent Labs   Lab 07/12/19  2335   WBC 17.92*   HGB 15.0   HCT 44.4        CMP:   Recent Labs   Lab 07/12/19  2335      K 4.3   CL 99   CO2 22*   *   BUN 16   CREATININE 1.9*   CALCIUM 11.4*   PROT 10.9*   ALBUMIN 5.9*   BILITOT 0.5   ALKPHOS 140*   AST 40   ALT 42   ANIONGAP 20*   EGFRNONAA 47*     Urine Studies:   Recent Labs   Lab 07/13/19  0050   COLORU Brown*   APPEARANCEUA Cloudy*   PHUR 6.0   SPECGRAV >=1.030*   PROTEINUA 3+*   GLUCUA Negative   KETONESU 1+*   BILIRUBINUA 2+*   OCCULTUA Trace*   NITRITE Positive*   UROBILINOGEN 1.0   LEUKOCYTESUR Trace*   RBCUA 4   WBCUA 73*   BACTERIA Moderate*   SQUAMEPITHEL 7   HYALINECASTS 17*       Significant Imaging: none    Assessment/Plan:     * SEMAJ (acute kidney injury)  Likely 2/2 nausea and vomiting, dehydration.  Monitor renal function panel and electrolytes closely.  Avoid  nonessential nephrotoxins.  Renal dose medications for Estimated Creatinine Clearance: 54.4 mL/min (A) (based on SCr of 1.9 mg/dL (H)).        Severe sepsis  This patient does have evidence of infective focus  My overall impression is severe sepsis.  Antibiotics given-   Antibiotics (From admission, onward)    Start     Stop Route Frequency Ordered    07/14/19 0300  cefTRIAXone (ROCEPHIN) 1 g in dextrose 5 % 50 mL IVPB      -- IV Every 24 hours (non-standard times) 07/13/19 0224          Severe Sepsis only-  Latest labs reviewed, they are-  Recent Labs   Lab 07/12/19  2335   BILITOT 0.5     Recent Labs   Lab 07/13/19  0216   LACTATE 3.0*     No results for input(s): PH, PO2, PCO2, HCO3, BE in the last 168 hours.    Organ dysfunction indicated by Acute kidney injury    Vital signs post fluid administrations were-    Temp Readings from Last 1 Encounters:   07/13/19 99 °F (37.2 °C)     BP Readings from Last 1 Encounters:   07/13/19 (!) 146/68     Pulse Readings from Last 1 Encounters:   07/13/19 71       Source: UTI  IV rocephin  Follow urine and blood cultures   Trend lactate level  IV hydration      UTI (urinary tract infection)  Likely etiology of patients nausea and vomiting. IV rocephin and monitor urine culture for growth and sensitivity.      Dehydration  Secondary to nausea and vomiting.  IV hydration. Antiemetics prn.       VTE Risk Mitigation (From admission, onward)    Callum/scdbella Gibbons NP  Department of Hospital Medicine   [facility]

## 2019-07-13 NOTE — DISCHARGE INSTRUCTIONS
Thank you for choosing [facility] for your medical care. The primary doctor who is taking care of you at the time of your discharge is Sandy Triplett MD.     You were admitted to the hospital with SEMAJ (acute kidney injury).     Please note your discharge instructions, including diet/activity restrictions, follow-up appointments, and medication changes.  If you have any questions about your medical issues, prescriptions, or any other questions, please feel free to contact the [facility] Medicine Dept at [phone] and we will help.    If you are previously with Home health, outpatient PT/OT or under a therapy program, you are cleared to return to those programs.    Please direct all long term medication refills and follow up to your primary care provider, Sharonda Oswald NP. Thank you again for letting us take care of your health care needs.    Please note the following discharge instructions per your discharging physician-  Manuel ANP-C      Drink plenty of fluids. Stay well hydrated.   Complete full course of antibiotics.

## 2019-07-13 NOTE — NURSING
Discharged pt to home via private car. Pt completed IV ABT. Denies nausea and vomiting. Discharge instructions given, Pt verbalized instructions. IV removed without difficulty. Nurse relinquished care.

## 2019-07-13 NOTE — ASSESSMENT & PLAN NOTE
Likely 2/2 nausea and vomiting, dehydration.  Monitor renal function panel and electrolytes closely.  Avoid nonessential nephrotoxins.  Renal dose medications for Estimated Creatinine Clearance: 54.4 mL/min (A) (based on SCr of 1.9 mg/dL (H)).

## 2019-07-13 NOTE — SUBJECTIVE & OBJECTIVE
Past Medical History:   Diagnosis Date    GERD (gastroesophageal reflux disease)        History reviewed. No pertinent surgical history.    Review of patient's allergies indicates:   Allergen Reactions    Pcn [penicillins]        No current facility-administered medications on file prior to encounter.      Current Outpatient Medications on File Prior to Encounter   Medication Sig    omeprazole (PRILOSEC) 20 MG capsule Take 1 capsule (20 mg total) by mouth once daily.    ondansetron (ZOFRAN-ODT) 4 MG TbDL Take 1 tablet (4 mg total) by mouth every 6 (six) hours as needed (nausea).     Family History     Problem Relation (Age of Onset)    No Known Problems Mother, Father        Tobacco Use    Smoking status: Current Every Day Smoker   Substance and Sexual Activity    Alcohol use: No    Drug use: Yes     Types: Marijuana    Sexual activity: Not on file     Review of Systems   Constitutional: Negative for activity change, appetite change, chills, fatigue and fever.   HENT: Negative for congestion, hearing loss, sore throat and trouble swallowing.    Eyes: Negative for photophobia and visual disturbance.   Respiratory: Negative for cough, shortness of breath and wheezing.    Cardiovascular: Negative for chest pain, palpitations and leg swelling.   Gastrointestinal: Positive for abdominal pain (cramping/soreness post vomiting), nausea and vomiting. Negative for diarrhea.   Endocrine: Negative for polydipsia, polyphagia and polyuria.   Genitourinary: Positive for difficulty urinating and dysuria. Negative for frequency and urgency.   Musculoskeletal: Negative for neck pain and neck stiffness.   Skin: Negative for rash and wound.   Neurological: Negative for dizziness, weakness, numbness and headaches.   Psychiatric/Behavioral: Negative for confusion. The patient is not nervous/anxious.      Objective:     Vital Signs (Most Recent):  Temp: 98.5 °F (36.9 °C) (07/12/19 2316)  Pulse: (!) 57 (07/13/19 0101)  Resp: 20  (07/12/19 2316)  BP: 139/68 (07/13/19 0101)  SpO2: 95 % (07/13/19 0101) Vital Signs (24h Range):  Temp:  [98.5 °F (36.9 °C)] 98.5 °F (36.9 °C)  Pulse:  [54-99] 57  Resp:  [20] 20  SpO2:  [95 %-100 %] 95 %  BP: (135-139)/(67-82) 139/68     Weight: 65.8 kg (145 lb)  Body mass index is 19.13 kg/m².    Physical Exam   Constitutional: He is oriented to person, place, and time. He appears well-developed and well-nourished. No distress.   HENT:   Head: Normocephalic and atraumatic.   Mucous membranes dry.   Eyes: Pupils are equal, round, and reactive to light. Conjunctivae and EOM are normal.   Neck: Normal range of motion. Neck supple. No tracheal deviation present. No thyromegaly present.   Cardiovascular: Normal rate, regular rhythm, normal heart sounds and intact distal pulses. Exam reveals no gallop and no friction rub.   No murmur heard.  Pulses:       Dorsalis pedis pulses are 2+ on the right side, and 2+ on the left side.   Pulmonary/Chest: Effort normal and breath sounds normal. No accessory muscle usage. No respiratory distress. He has no wheezes. He has no rhonchi. He has no rales.   Abdominal: Soft. Bowel sounds are normal. He exhibits no distension and no mass. There is no tenderness.   Musculoskeletal: Normal range of motion. He exhibits no edema, tenderness or deformity.   Neurological: He is alert and oriented to person, place, and time. He has normal strength. Coordination normal.   Skin: Skin is warm, dry and intact. Capillary refill takes less than 2 seconds. No rash noted. He is not diaphoretic. No erythema.   Psychiatric: He has a normal mood and affect. His speech is normal and behavior is normal.   Vitals reviewed.        CRANIAL NERVES     CN III, IV, VI   Pupils are equal, round, and reactive to light.  Extraocular motions are normal.        Significant Labs:   CBC:   Recent Labs   Lab 07/12/19  2335   WBC 17.92*   HGB 15.0   HCT 44.4        CMP:   Recent Labs   Lab 07/12/19  2335       K 4.3   CL 99   CO2 22*   *   BUN 16   CREATININE 1.9*   CALCIUM 11.4*   PROT 10.9*   ALBUMIN 5.9*   BILITOT 0.5   ALKPHOS 140*   AST 40   ALT 42   ANIONGAP 20*   EGFRNONAA 47*     Urine Studies:   Recent Labs   Lab 07/13/19  0050   COLORU Brown*   APPEARANCEUA Cloudy*   PHUR 6.0   SPECGRAV >=1.030*   PROTEINUA 3+*   GLUCUA Negative   KETONESU 1+*   BILIRUBINUA 2+*   OCCULTUA Trace*   NITRITE Positive*   UROBILINOGEN 1.0   LEUKOCYTESUR Trace*   RBCUA 4   WBCUA 73*   BACTERIA Moderate*   SQUAMEPITHEL 7   HYALINECASTS 17*       Significant Imaging: none

## 2019-07-13 NOTE — ED NOTES
Assumed care:  nIo Perez awake, alert and oriented x 3, skin warm and dry, in NAD.    Patient identifiers for Ino Perez checked and correct.  LOC:  Ino Perez is awake, alert, and aware of environment with an appropriate affect. He is oriented x 3 and speaking appropriately.  APPEARANCE:  He is resting comfortably and in no acute distress. He is clean and well groomed, patient's clothing is properly fastened.  SKIN:  The skin is warm and dry. He has normal skin turgor and moist mucus membranes. Skin is intact; no bruising or breakdown noted.  MUSCULOSKELETAL:  He is moving all extremities well, no obvious deformities noted. Pulses intact.   RESPIRATORY:  Airway is open and patent. Respirations are spontaneous and non-labored with normal effort and rate.  CARDIAC:  He has a normal rate and rhythm. No peripheral edema noted. Capillary refill < 3 seconds.  ABDOMEN:  No distention noted.  Soft and non-tender upon palpation.  N&V, abd pain  NEUROLOGICAL:  PERRL. Facial expression is symmetrical. Hand grasps are equal bilaterally. Normal sensation in all extremities when touched with finger.  Allergies reported:    Review of patient's allergies indicates:   Allergen Reactions    Pcn [penicillins]      OTHER NOTES:  CO abd pain with N&V since this morning.

## 2019-07-13 NOTE — PLAN OF CARE
07/13/19 1522   Final Note   Assessment Type Final Discharge Note   Anticipated Discharge Disposition Home

## 2019-07-13 NOTE — PLAN OF CARE
07/13/19 1316   Discharge Assessment   Assessment Type Discharge Planning Assessment   Confirmed/corrected address and phone number on facesheet? Yes   Assessment information obtained from? Patient   Prior to hospitilization cognitive status: Alert/Oriented   Prior to hospitalization functional status: Independent   Current cognitive status: Alert/Oriented   Current Functional Status: Independent   Facility Arrived From: home   Lives With parent(s)   Able to Return to Prior Arrangements yes   Is patient able to care for self after discharge? Yes   Who are your caregiver(s) and their phone number(s)? grandfather:  Puma Oswald  [phone]   Patient's perception of discharge disposition home or selfcare   Readmission Within the Last 30 Days no previous admission in last 30 days   Patient currently being followed by outpatient case management? No   Patient currently receives any other outside agency services? No   Equipment Currently Used at Home none   Do you have any problems affording any of your prescribed medications? No   Does the patient have transportation home? Yes   Transportation Anticipated car, drives self   Does the patient receive services at the [facility]? No   Discharge Plan A Home with family   Discharge Plan B Home with family   DME Needed Upon Discharge  none   Patient/Family in Agreement with Plan yes

## 2019-07-13 NOTE — ASSESSMENT & PLAN NOTE
Patient with abdominal cramping, nausea and vomiting.  Likely viral.  IV hydration and antiemetics p.r.n..  Bentyl p.r.n. for abdominal cramping.

## 2019-07-13 NOTE — ED PROVIDER NOTES
Encounter Date: 7/12/2019       History     Chief Complaint   Patient presents with    Emesis     started at 10 am with nausea     Patient is a 28 y.o. male who presents to the ED 07/12/2019 with a chief complaint of  vomiting that started this morning with associated nausea and abdominal cramping.  Patient states he feels like it was something he ate may be yesterday.  He states he ate some spicy sausage any feels like this may have contributed to his symptoms. He said he has had this once before approximately year ago.  He denies any fever.  Denies any diarrhea.  Denies any past medical history and states he is not currently taking any medications.  He states he is allergic to penicillin.             Review of patient's allergies indicates:   Allergen Reactions    Pcn [penicillins]      Past Medical History:   Diagnosis Date    GERD (gastroesophageal reflux disease)      History reviewed. No pertinent surgical history.  History reviewed. No pertinent family history.  Social History     Tobacco Use    Smoking status: Current Every Day Smoker   Substance Use Topics    Alcohol use: No    Drug use: Yes     Types: Marijuana     Review of Systems   Constitutional: Negative for chills and fever.   HENT: Negative for sore throat.    Respiratory: Negative for chest tightness and shortness of breath.    Cardiovascular: Negative for chest pain.   Gastrointestinal: Positive for nausea and vomiting. Negative for abdominal pain.   Genitourinary: Negative for dysuria.   Musculoskeletal: Negative for arthralgias and myalgias.   Skin: Negative for rash and wound.   Neurological: Negative for syncope.   Hematological: Does not bruise/bleed easily.       Physical Exam     Initial Vitals [07/12/19 2316]   BP Pulse Resp Temp SpO2   138/82 99 20 98.5 °F (36.9 °C) 100 %      MAP       --         Physical Exam    Nursing note and vitals reviewed.  Constitutional: He appears well-developed and well-nourished.   HENT:   Head:  Normocephalic and atraumatic.   Eyes: Conjunctivae are normal. Pupils are equal, round, and reactive to light. Right eye exhibits no discharge. Left eye exhibits no discharge.   Neck: Normal range of motion. Neck supple.   Cardiovascular: Normal rate, regular rhythm, normal heart sounds and intact distal pulses.   Pulmonary/Chest: Breath sounds normal.   Abdominal: Soft. Bowel sounds are normal. He exhibits no distension and no mass. There is no tenderness. There is no rebound and no guarding.   Musculoskeletal: Normal range of motion.   Neurological: He is alert and oriented to person, place, and time. He has normal strength. No sensory deficit.   Skin: Skin is warm and dry. Capillary refill takes less than 2 seconds.   Psychiatric: He has a normal mood and affect.         ED Course   Procedures  Labs Reviewed   CBC W/ AUTO DIFFERENTIAL   COMPREHENSIVE METABOLIC PANEL   LIPASE   URINALYSIS, REFLEX TO URINE CULTURE          Imaging Results    None          Medical Decision Making:   Differential Diagnosis:   Food poisoning  Gastroenteritis  Obstruction        APC / Resident Notes:   Patient is a 28 y.o. male who presents to the ED 07/12/2019 who underwent emergent evaluation for nausea and vomiting that started today.  Patient reports some associated abdominal cramping.  Denies any real pain. He denies any diarrhea.  Patient's benign abdominal exam.  He has absolutely no tenderness, guarding, rigidity, distention. I do not think obstruction or other emergent process such as appendicitis. I do not think emergent CT scan is indicated at this time.   Likely viral gastroenteritis.  Patient is afebrile.  His HR of 99 resolves with IVF rehydration. I doubt bacterial etiology and see no indication for antibiotics at this time.  However patient is still vomiting in the emergency department after antiemetics and patient also is noted to have elevated creatinine leukocytosis.  Patient also having poor urine output.  Patient  is given aggressive IV fluid rehydration in the emergency department and antiemetics are continued.  Given these findings case is discussed with hospital medicine team to admit patient overnight for observation for elevated creatinine and poor urine output as well as intractable vomiting. Spoke with Carly Monroe NP of hospital medicine team who is accepting of this admission on behalf of  team. Case also discussed with Dr. Khan who is agreeable to plan of care.                     Clinical Impression:       ICD-10-CM ICD-9-CM   1. Dehydration E86.0 276.51   2. SEMAJ (acute kidney injury) N17.9 584.9   3. Gastroenteritis K52.9 558.9         Disposition:   Disposition: Placed in Observation  Condition: Stable                        Naz Howard NP  07/13/19 0118

## 2019-07-13 NOTE — ASSESSMENT & PLAN NOTE
This patient does have evidence of infective focus  My overall impression is severe sepsis.  Antibiotics given-   Antibiotics (From admission, onward)    Start     Stop Route Frequency Ordered    07/14/19 0300  cefTRIAXone (ROCEPHIN) 1 g in dextrose 5 % 50 mL IVPB      -- IV Every 24 hours (non-standard times) 07/13/19 0224          Severe Sepsis only-  Latest labs reviewed, they are-  Recent Labs   Lab 07/12/19  2335   BILITOT 0.5     Recent Labs   Lab 07/13/19  0216   LACTATE 3.0*     No results for input(s): PH, PO2, PCO2, HCO3, BE in the last 168 hours.    Organ dysfunction indicated by Acute kidney injury    Vital signs post fluid administrations were-    Temp Readings from Last 1 Encounters:   07/13/19 99 °F (37.2 °C)     BP Readings from Last 1 Encounters:   07/13/19 (!) 146/68     Pulse Readings from Last 1 Encounters:   07/13/19 71       Source: UTI  IV rocephin  Follow urine and blood cultures   Trend lactate level  IV hydration

## 2019-07-13 NOTE — HPI
"Ino Perez is a 27 yo male with no significant past medical history.  He presented to the ED with a 1 day onset of nausea and vomiting associated with abdominal cramping and difficulty urinating.  He believes this is attributed to something that he ate that is spicy, which has upset his stomach. He stated that he is having pressure with trying to urinate and it "hurts too much to go".  He denied fever, chest pain, SOB, diarrhea, dysuria, and any other symptoms at this time.  Patient has been bolused with 2 L of fluids in the ED and treated with antiemetics.  Laboratory work revealed in SEMAJ and nitrite positive UTI..  Patient will be admitted to Hospital Medicine for further evaluation.  "

## 2019-07-13 NOTE — ASSESSMENT & PLAN NOTE
Assistance with smoking cessation was offered, including:  [x]  Medications  [x]  Counseling  []  Printed Information on Smoking Cessation  []  Referral to a Smoking Cessation Program    Patient was counseled regarding smoking for 3-10 minutes.

## 2019-07-13 NOTE — NURSING
Pt arrived to the unit via w/c accompanied by ER tech.  Pt ambulated to the bed independently and requested an emesis bag for nausea and vomited.  PRN phenergan to be given.

## 2019-07-14 LAB — BACTERIA UR CULT: NORMAL

## 2019-07-14 NOTE — DISCHARGE SUMMARY
"[facility] Medicine  Discharge Summary      Patient Name: Ino Perez  MRN: [id number]  Admission Date: 7/12/2019  Hospital Length of Stay: 0 days  Discharge Date and Time: 7/13/2019  3:30 PM  Attending Physician: No att. providers found   Discharging Provider: Adrienne Stack NP  Primary Care Provider: Sharonda Oswald NP      HPI:   Ino Perez is a 29 yo male with no significant past medical history.  He presented to the ED with a 1 day onset of nausea and vomiting associated with abdominal cramping and difficulty urinating.  He believes this is attributed to something that he ate that is spicy, which has upset his stomach. He stated that he is having pressure with trying to urinate and it "hurts too much to go".  He denied fever, chest pain, SOB, diarrhea, dysuria, and any other symptoms at this time.  Patient has been bolused with 2 L of fluids in the ED and treated with antiemetics.  Laboratory work revealed in SEMAJ and nitrite positive UTI..  Patient will be admitted to Hospital Medicine for further evaluation.    * No surgery found *      Hospital Course:   Patient monitor closely during observation stay.  He was initiated on IV hydration for acute kidney injury.  He was found to have a mild urinary tract infection and was initiated on IV antibiotics and antiemetics.  Renal function improved with IV hydration.  Urine tested for gonorrhea and Chlamydia which is negative.  Urine culture and blood cultures pending. Patient is tolerating diet and is stable for discharge.     Physical exam  Chest clear auscultation  Heart regular rate and rhythm  Abdomen soft nontender nondistended.     Consults:     Severe sepsis  This patient does have evidence of infective focus  My overall impression is severe sepsis.  Antibiotics given-   Antibiotics (From admission, onward)    Start     Stop Route Frequency Ordered    07/14/19 0300  cefTRIAXone (ROCEPHIN) 1 g in dextrose 5 % " 50 mL IVPB      -- IV Every 24 hours (non-standard times) 07/13/19 0224          Severe Sepsis only-  Latest labs reviewed, they are-  Recent Labs   Lab 07/12/19  2335   BILITOT 0.5     Recent Labs   Lab 07/13/19  0216   LACTATE 3.0*     No results for input(s): PH, PO2, PCO2, HCO3, BE in the last 168 hours.    Organ dysfunction indicated by Acute kidney injury    Vital signs post fluid administrations were-    Temp Readings from Last 1 Encounters:   07/13/19 99 °F (37.2 °C)     BP Readings from Last 1 Encounters:   07/13/19 (!) 146/68     Pulse Readings from Last 1 Encounters:   07/13/19 71       Source: UTI  IV rocephin  Follow urine and blood cultures   Trend lactate level  IV hydration        Final Active Diagnoses:    Diagnosis Date Noted POA    PRINCIPAL PROBLEM:  SEMAJ (acute kidney injury) [N17.9] 07/13/2019 Yes    Dehydration [E86.0] 07/13/2019 Yes    UTI (urinary tract infection) [N39.0] 07/13/2019 Yes    Severe sepsis [A41.9, R65.20] 07/13/2019 Yes      Problems Resolved During this Admission:       Discharged Condition: stable    Disposition: Home or Self Care    Follow Up:  Follow-up Information     Sharonda Oswald NP In 1 week.    Specialty:  Family Medicine  Why:  hospital follow up  Contact information:  [address] [place] [id number]  [phone]                 Patient Instructions:      Diet Adult Regular     Notify your health care provider if you experience any of the following:  worsening rash     Notify your health care provider if you experience any of the following:  severe persistent headache     Notify your health care provider if you experience any of the following:  difficulty breathing or increased cough     Notify your health care provider if you experience any of the following:  redness, tenderness, or signs of infection (pain, swelling, redness, odor or green/yellow discharge around incision site)     Notify your health care provider if you  experience any of the following:  severe uncontrolled pain     Notify your health care provider if you experience any of the following:  temperature >100.4     Notify your health care provider if you experience any of the following:  persistent nausea and vomiting or diarrhea     Activity as tolerated       Significant Diagnostic Studies: Labs:   BMP:   Recent Labs   Lab 07/12/19  2335 07/13/19  1033   * 109    139   K 4.3 4.1   CL 99 101   CO2 22* 25   BUN 16 14   CREATININE 1.9* 1.1   CALCIUM 11.4* 10.1    and CMP   Recent Labs   Lab 07/12/19  2335 07/13/19  1033    139   K 4.3 4.1   CL 99 101   CO2 22* 25   * 109   BUN 16 14   CREATININE 1.9* 1.1   CALCIUM 11.4* 10.1   PROT 10.9*  --    ALBUMIN 5.9*  --    BILITOT 0.5  --    ALKPHOS 140*  --    AST 40  --    ALT 42  --    ANIONGAP 20* 13   ESTGFRAFRICA 55* >60   EGFRNONAA 47* >60       Pending Diagnostic Studies:     None         Medications:  Reconciled Home Medications:      Medication List      START taking these medications    ciprofloxacin HCl 500 MG tablet  Commonly known as:  CIPRO  Take 1 tablet (500 mg total) by mouth every 12 (twelve) hours. for 7 days        CHANGE how you take these medications    ondansetron 4 MG Tbdl  Commonly known as:  ZOFRAN-ODT  Take 2 tablets (8 mg total) by mouth every 8 (eight) hours as needed (nausea).  What changed:    · how much to take  · when to take this        CONTINUE taking these medications    omeprazole 20 MG capsule  Commonly known as:  PRILOSEC  Take 1 capsule (20 mg total) by mouth once daily.            Indwelling Lines/Drains at time of discharge:   Lines/Drains/Airways          None          Time spent on the discharge of patient: 50 minutes  Patient was seen and examined on the date of discharge and determined to be suitable for discharge.         Adrienne Stack NP  Department of Hospital Medicine  [facility]

## 2019-07-14 NOTE — HOSPITAL COURSE
Patient monitor closely during observation stay.  He was initiated on IV hydration for acute kidney injury.  He was found to have a mild urinary tract infection and was initiated on IV antibiotics and antiemetics.  Renal function improved with IV hydration.  Urine tested for gonorrhea and Chlamydia which is negative.  Urine culture and blood cultures pending. Patient is tolerating diet and is stable for discharge.     Physical exam  Chest clear auscultation  Heart regular rate and rhythm  Abdomen soft nontender nondistended.

## 2019-07-15 ENCOUNTER — TELEPHONE (OUTPATIENT)
Dept: MEDSURG UNIT | Facility: HOSPITAL | Age: 29
End: 2019-07-15

## 2019-07-18 LAB
BACTERIA BLD CULT: NORMAL
BACTERIA BLD CULT: NORMAL

## 2019-08-02 ENCOUNTER — HOSPITAL ENCOUNTER (EMERGENCY)
Facility: HOSPITAL | Age: 29
Discharge: HOME | End: 2019-08-02
Attending: EMERGENCY MEDICINE
Payer: MEDICAID

## 2019-08-02 VITALS
DIASTOLIC BLOOD PRESSURE: 85 MMHG | TEMPERATURE: 99 F | SYSTOLIC BLOOD PRESSURE: 159 MMHG | HEIGHT: 73 IN | OXYGEN SATURATION: 95 % | HEART RATE: 78 BPM | WEIGHT: 143 LBS | BODY MASS INDEX: 18.95 KG/M2 | RESPIRATION RATE: 16 BRPM

## 2019-08-02 DIAGNOSIS — R11.10 VOMITING, INTRACTABILITY OF VOMITING NOT SPECIFIED, PRESENCE OF NAUSEA NOT SPECIFIED, UNSPECIFIED VOMITING TYPE: ICD-10-CM

## 2019-08-02 DIAGNOSIS — E86.0 DEHYDRATION: Primary | ICD-10-CM

## 2019-08-02 DIAGNOSIS — N28.9 RENAL INSUFFICIENCY: ICD-10-CM

## 2019-08-02 LAB
ALBUMIN SERPL BCP-MCNC: 5.8 G/DL (ref 3.5–5.2)
ALP SERPL-CCNC: 137 U/L (ref 55–135)
ALT SERPL W/O P-5'-P-CCNC: 36 U/L (ref 10–44)
AMPHET+METHAMPHET UR QL: NEGATIVE
ANION GAP SERPL CALC-SCNC: 17 MMOL/L (ref 8–16)
AST SERPL-CCNC: 28 U/L (ref 10–40)
BACTERIA #/AREA URNS HPF: ABNORMAL /HPF
BARBITURATES UR QL SCN>200 NG/ML: NEGATIVE
BASOPHILS # BLD AUTO: 0.03 K/UL (ref 0–0.2)
BASOPHILS NFR BLD: 0.2 % (ref 0–1.9)
BENZODIAZ UR QL SCN>200 NG/ML: NEGATIVE
BILIRUB SERPL-MCNC: 0.5 MG/DL (ref 0.1–1)
BILIRUB UR QL STRIP: ABNORMAL
BUN SERPL-MCNC: 21 MG/DL (ref 6–20)
BZE UR QL SCN: NEGATIVE
CALCIUM SERPL-MCNC: 11.6 MG/DL (ref 8.7–10.5)
CANNABINOIDS UR QL SCN: ABNORMAL
CHLORIDE SERPL-SCNC: 97 MMOL/L (ref 95–110)
CLARITY UR: ABNORMAL
CO2 SERPL-SCNC: 23 MMOL/L (ref 23–29)
COLOR UR: YELLOW
CREAT SERPL-MCNC: 1.7 MG/DL (ref 0.5–1.4)
CREAT UR-MCNC: >450 MG/DL (ref 23–375)
DIFFERENTIAL METHOD: ABNORMAL
EOSINOPHIL # BLD AUTO: 0 K/UL (ref 0–0.5)
EOSINOPHIL NFR BLD: 0 % (ref 0–8)
ERYTHROCYTE [DISTWIDTH] IN BLOOD BY AUTOMATED COUNT: 11.9 % (ref 11.5–14.5)
EST. GFR  (AFRICAN AMERICAN): >60 ML/MIN/1.73 M^2
EST. GFR  (NON AFRICAN AMERICAN): 54 ML/MIN/1.73 M^2
GLUCOSE SERPL-MCNC: 122 MG/DL (ref 70–110)
GLUCOSE UR QL STRIP: NEGATIVE
HCT VFR BLD AUTO: 45.3 % (ref 40–54)
HGB BLD-MCNC: 15.1 G/DL (ref 14–18)
HGB UR QL STRIP: ABNORMAL
HYALINE CASTS #/AREA URNS LPF: >100 /LPF
IMM GRANULOCYTES # BLD AUTO: 0.04 K/UL (ref 0–0.04)
KETONES UR QL STRIP: ABNORMAL
LEUKOCYTE ESTERASE UR QL STRIP: ABNORMAL
LIPASE SERPL-CCNC: 15 U/L (ref 4–60)
LYMPHOCYTES # BLD AUTO: 1.4 K/UL (ref 1–4.8)
LYMPHOCYTES NFR BLD: 9.1 % (ref 18–48)
MCH RBC QN AUTO: 32.8 PG (ref 27–31)
MCHC RBC AUTO-ENTMCNC: 33.3 G/DL (ref 32–36)
MCV RBC AUTO: 99 FL (ref 82–98)
METHADONE UR QL SCN>300 NG/ML: NEGATIVE
MICROSCOPIC COMMENT: ABNORMAL
MONOCYTES # BLD AUTO: 0.7 K/UL (ref 0.3–1)
MONOCYTES NFR BLD: 4.6 % (ref 4–15)
NEUTROPHILS # BLD AUTO: 12.7 K/UL (ref 1.8–7.7)
NEUTROPHILS NFR BLD: 85.8 % (ref 38–73)
NITRITE UR QL STRIP: NEGATIVE
NRBC BLD-RTO: 0 /100 WBC
OPIATES UR QL SCN: NEGATIVE
PCP UR QL SCN>25 NG/ML: NEGATIVE
PH UR STRIP: 6 [PH] (ref 5–8)
PLATELET # BLD AUTO: 219 K/UL (ref 150–350)
PMV BLD AUTO: 10 FL (ref 9.2–12.9)
POTASSIUM SERPL-SCNC: 5.1 MMOL/L (ref 3.5–5.1)
PROT SERPL-MCNC: 10.5 G/DL (ref 6–8.4)
PROT UR QL STRIP: ABNORMAL
RBC # BLD AUTO: 4.6 M/UL (ref 4.6–6.2)
RBC #/AREA URNS HPF: 5 /HPF (ref 0–4)
SODIUM SERPL-SCNC: 137 MMOL/L (ref 136–145)
SP GR UR STRIP: >=1.03 (ref 1–1.03)
TOXICOLOGY INFORMATION: ABNORMAL
URN SPEC COLLECT METH UR: ABNORMAL
UROBILINOGEN UR STRIP-ACNC: 1 EU/DL
WBC # BLD AUTO: 14.8 K/UL (ref 3.9–12.7)
WBC #/AREA URNS HPF: 20 /HPF (ref 0–5)

## 2019-08-02 PROCEDURE — 63600175 PHARM REV CODE 636 W HCPCS: Performed by: EMERGENCY MEDICINE

## 2019-08-02 PROCEDURE — 85025 COMPLETE CBC W/AUTO DIFF WBC: CPT

## 2019-08-02 PROCEDURE — 96361 HYDRATE IV INFUSION ADD-ON: CPT

## 2019-08-02 PROCEDURE — 96365 THER/PROPH/DIAG IV INF INIT: CPT

## 2019-08-02 PROCEDURE — 80053 COMPREHEN METABOLIC PANEL: CPT

## 2019-08-02 PROCEDURE — 96375 TX/PRO/DX INJ NEW DRUG ADDON: CPT

## 2019-08-02 PROCEDURE — 83690 ASSAY OF LIPASE: CPT

## 2019-08-02 PROCEDURE — 99284 EMERGENCY DEPT VISIT MOD MDM: CPT | Mod: 25

## 2019-08-02 PROCEDURE — 36415 COLL VENOUS BLD VENIPUNCTURE: CPT

## 2019-08-02 PROCEDURE — 81000 URINALYSIS NONAUTO W/SCOPE: CPT | Mod: 59

## 2019-08-02 PROCEDURE — 87086 URINE CULTURE/COLONY COUNT: CPT

## 2019-08-02 PROCEDURE — 80307 DRUG TEST PRSMV CHEM ANLYZR: CPT

## 2019-08-02 RX ORDER — ONDANSETRON 2 MG/ML
4 INJECTION INTRAMUSCULAR; INTRAVENOUS
Status: COMPLETED | OUTPATIENT
Start: 2019-08-02 | End: 2019-08-02

## 2019-08-02 RX ORDER — PANTOPRAZOLE SODIUM 20 MG/1
20 TABLET, DELAYED RELEASE ORAL DAILY
Qty: 30 TABLET | Refills: 0 | Status: SHIPPED | OUTPATIENT
Start: 2019-08-02 | End: 2020-01-30

## 2019-08-02 RX ORDER — ONDANSETRON 4 MG/1
4 TABLET, ORALLY DISINTEGRATING ORAL EVERY 8 HOURS PRN
Qty: 12 TABLET | Refills: 0 | Status: SHIPPED | OUTPATIENT
Start: 2019-08-02 | End: 2020-01-30

## 2019-08-02 RX ADMIN — SODIUM CHLORIDE, SODIUM LACTATE, POTASSIUM CHLORIDE, AND CALCIUM CHLORIDE 1000 ML: .6; .31; .03; .02 INJECTION, SOLUTION INTRAVENOUS at 06:08

## 2019-08-02 RX ADMIN — SODIUM CHLORIDE 1000 ML: 0.9 INJECTION, SOLUTION INTRAVENOUS at 05:08

## 2019-08-02 RX ADMIN — ONDANSETRON 4 MG: 2 INJECTION INTRAMUSCULAR; INTRAVENOUS at 05:08

## 2019-08-02 NOTE — ED PROVIDER NOTES
Encounter Date: 8/2/2019       History     Chief Complaint   Patient presents with    Abdominal Cramping     with frequent episodes of N/V since yesterday     28-year-old male with a past medical history of reflux disease presents with a chief complaint of abdominal cramping and vomiting.  The patient reports his symptoms started acutely 1 day ago.  He reports that it is associated with diarrhea.  He denies any associated fever, cough, congestion, hematochezia, hematuria, or dysuria.  The patient reports his symptoms are unrelieved with his home Zofran.  The patient reports that he does chronically use marijuana as well. He reports improvement in his symptoms after taking a hot shower.        Review of patient's allergies indicates:   Allergen Reactions    Pcn [penicillins]      Past Medical History:   Diagnosis Date    GERD (gastroesophageal reflux disease)      History reviewed. No pertinent surgical history.  Family History   Problem Relation Age of Onset    No Known Problems Mother     No Known Problems Father      Social History     Tobacco Use    Smoking status: Current Every Day Smoker   Substance Use Topics    Alcohol use: No    Drug use: Yes     Types: Marijuana     Review of Systems   Constitutional: Negative for chills, diaphoresis, fatigue and fever.   HENT: Negative for congestion and rhinorrhea.    Respiratory: Negative for cough and shortness of breath.    Cardiovascular: Negative for chest pain.   Gastrointestinal: Positive for abdominal pain, diarrhea, nausea and vomiting. Negative for blood in stool and constipation.   Genitourinary: Negative for dysuria, frequency and testicular pain.   Musculoskeletal: Negative for gait problem.   Skin: Negative for color change.   Neurological: Negative for dizziness and numbness.   Psychiatric/Behavioral: Negative for agitation and confusion.       Physical Exam     Initial Vitals [08/02/19 0515]   BP Pulse Resp Temp SpO2   (!) 159/85 78 16 99 °F (37.2  °C) 95 %      MAP       --         Physical Exam    Nursing note and vitals reviewed.  Constitutional: He appears well-developed and well-nourished.   HENT:   Head: Normocephalic and atraumatic.   Eyes: EOM are normal. Pupils are equal, round, and reactive to light.   Neck: Neck supple.   Cardiovascular: Normal rate and regular rhythm.   Pulmonary/Chest: Breath sounds normal.   Abdominal: Soft. Bowel sounds are normal. He exhibits no distension. There is no tenderness. There is no rebound and no guarding.   Musculoskeletal: Normal range of motion.   Neurological: He is alert and oriented to person, place, and time.   Skin: Skin is warm and dry.   Psychiatric: He has a normal mood and affect.         ED Course   Procedures  Labs Reviewed   URINALYSIS, REFLEX TO URINE CULTURE   DRUG SCREEN PANEL, URINE EMERGENCY   COMPREHENSIVE METABOLIC PANEL   CBC W/ AUTO DIFFERENTIAL   LIPASE          Imaging Results    None          Medical Decision Making:   Initial Assessment:   28-year-old male presented with abdominal cramping and vomiting.  Differential Diagnosis:   Initial differential diagnosis included but not limited to pancreatitis, hyperemesis cannabinoid syndrome, dehydration, and acute kidney injury.  Clinical Tests:   Lab Tests: Ordered and Reviewed  ED Management:  The patient was emergently evaluated in the emergency department, his evaluation was significant for a young male with a benign abdominal exam.  The patient's labs were significant for dehydration, questionable infection in his urine and marijuana noted in his toxicology screen.  The patient was aggressively rehydrated with multiple IV fluid boluses and given IV Zofran, with improvement in his symptoms. The etiology of the patient's episodes of vomiting is likely his marijuana usage.  The patient is stable for discharge to home.  He will be discharged home with ODT Zofran, and he is been instructed to stop his marijuana usage.  Additionally will follow a  urine culture in this patient, and contact him should it grow anything.                      Clinical Impression:       ICD-10-CM ICD-9-CM   1. Dehydration E86.0 276.51   2. Renal insufficiency N28.9 593.9   3. Vomiting, intractability of vomiting not specified, presence of nausea not specified, unspecified vomiting type R11.10 787.03         Disposition:   Disposition: Discharged  Condition: Stable                        Mauro Bunch MD  08/05/19 2025

## 2019-08-03 LAB — BACTERIA UR CULT: NO GROWTH

## 2020-01-30 ENCOUNTER — OFFICE VISIT (OUTPATIENT)
Dept: FAMILY MEDICINE | Facility: CLINIC | Age: 30
End: 2020-01-30
Payer: MEDICAID

## 2020-01-30 VITALS
WEIGHT: 150.19 LBS | HEART RATE: 73 BPM | OXYGEN SATURATION: 99 % | DIASTOLIC BLOOD PRESSURE: 60 MMHG | TEMPERATURE: 98 F | BODY MASS INDEX: 19.9 KG/M2 | HEIGHT: 73 IN | SYSTOLIC BLOOD PRESSURE: 112 MMHG

## 2020-01-30 DIAGNOSIS — A63.0 GENITAL WARTS: ICD-10-CM

## 2020-01-30 DIAGNOSIS — R36.9 PENILE DISCHARGE: ICD-10-CM

## 2020-01-30 DIAGNOSIS — Z20.2 STD EXPOSURE: Primary | ICD-10-CM

## 2020-01-30 PROCEDURE — 99204 OFFICE O/P NEW MOD 45 MIN: CPT | Performed by: NURSE PRACTITIONER

## 2020-01-30 PROCEDURE — 99203 OFFICE O/P NEW LOW 30 MIN: CPT | Mod: S$PBB,,, | Performed by: NURSE PRACTITIONER

## 2020-01-30 PROCEDURE — 99203 PR OFFICE/OUTPT VISIT, NEW, LEVL III, 30-44 MIN: ICD-10-PCS | Mod: S$PBB,,, | Performed by: NURSE PRACTITIONER

## 2020-01-30 RX ORDER — FAMOTIDINE 10 MG/1
10 TABLET ORAL 2 TIMES DAILY
COMMUNITY
End: 2022-11-10

## 2020-01-30 RX ORDER — DOXYCYCLINE HYCLATE 100 MG
100 TABLET ORAL 2 TIMES DAILY
Qty: 14 TABLET | Refills: 0 | Status: SHIPPED | OUTPATIENT
Start: 2020-01-30 | End: 2020-02-06

## 2020-01-30 NOTE — PATIENT INSTRUCTIONS
Genital Warts (Condyloma)     Ask your healthcare provider about the HPV vaccine.     Genital warts (also called condyloma) are caused by a virus that is often transmitted sexually. This virus is known as human papillomavirus  (HPV). The warts are often so tiny that they are hard to see. But even tiny warts can cause big trouble, especially in women. Genital warts can cause cell changes that can lead to genital cancers such as cervical cancer. Warts can even be passed to babies during childbirth.  Note: Latex condoms may help protect against genital warts. But condoms dont cover all the areas that can get infected. That means condoms may not protect you completely.   What are the symptoms of genital warts?  Genital warts can be flat. Or they can be raised and look like tiny cauliflowers. They can grow on the penis, vagina, or cervix. They can also grow in and around the rectum, and even in the throat. You can have the virus for many months before the warts appear. Or you may have the virus but never develop visible warts. Once warts form, they are often too small to be noticed. Thats why you need regular exams by your healthcare provider. He or she can find tiny warts and can check your cells for changes that mean the virus is present.  What is the treatment of genital warts?  Genital warts tend to grow with time. The smaller the warts are, the easier they are to remove. So dont delay. Warts are most often removed with chemicals. Sometimes theyre frozen off with liquid nitrogen. Warts may also be removed with heat or laser. More than one treatment may be needed. Never try to treat genital warts yourself.  How are genital warts prevented?  To prevent genital warts, consider getting vaccinated. Your healthcare provider can tell you if the vaccine is right for you. Also know your partners sexual history. Even if someone doesnt have visible warts, he or she can still transmit the virus. Protect yourself by using  latex condoms. And get regular medical exams. In women, regular Pap smears can help detect changes caused by genital warts and catch any signs of cervical cancer early. Also, ask your healthcare provider about the HPV vaccine.  Resources  [employer] STD Hotline   156.370.7437   www.ashastd.org  Centers for Disease Control and Prevention   760.540.4626  www.cdc.gov/std   Date Last Reviewed: 1/1/2017 © 2000-2017 [employer]. [address] [place] [address], [place] [address]. All rights reserved. This information is not intended as a substitute for professional medical care. Always follow your healthcare professional's instructions.        If You Think You Have an STD  Treating a sexually transmitted disease (STD) early limits the problems they can cause. If you have an STD, get treated right away. Ask your partner to be tested, too. Then avoid sex until youve finished treatment and your healthcare provider says its OK to have sex again.    Follow your treatment plan  Treatment depends on the type of STD you have. Common treatments include injections and oral pills or liquids. Creams and gels can be applied to sores caused by certain STDs. Follow the tips below:  · Get new treatment for each new STD.  · Dont use old medicine, even for the same STD. Use medicines as directed.  · Dont share medicine unless instructed to do so by your healthcare provider or clinic.  Talk to your partner  If you have an STD, its your duty to tell all your recent partners so they can be tested and treated. This is one important way to prevent the disease from being spread. Telling a partner that you have an STD can be hard. You may be embarrassed, angry, or afraid. Its often unclear who had the STD first. So try not to place blame. Your healthcare provider may offer some advice on how to begin.  Prevent future problems  Even after youve been treated, you can still be infected again. This is a common  problem. It happens when a partner passes the STD back to you. To avoid this, your partner must be tested. He or she may also need treatment. After treatment, go to any scheduled follow-up visits. Then prevent future problems with safer sex. Limit your number of partners and always use a latex condom.  Diagnosing STDS  Your healthcare provider will take a health history and examine you. During your health history, you will be asked about your sex habits and health history. You may also be asked about drug use. Give honest answers. Your healthcare provider will then check your body for signs of STDs. He or she also may perform one or more of the following tests:  · Fluid is swabbed from any sores. Samples also may be taken from the vagina, penis, mouth, or rectum. The samples are then tested for STDs.  · Blood or urine samples may be taken. They are checked for viruses or bacteria that cause STDs.  · For women, cells from the cervix (where the vagina and uterus meet) are checked for signs of cancer. This is called a Pap smear. If cell changes are found, a magnifying scope may be used to take a closer look (colposcopy).   Date Last Reviewed: 12/1/2016  © 5332-8676 [employer]. [address] [place] [address], [place] [address]. All rights reserved. This information is not intended as a substitute for professional medical care. Always follow your healthcare professional's instructions.

## 2020-01-30 NOTE — PROGRESS NOTES
"      SUBJECTIVE:      Patient ID: Ino Perez is a 29 y.o. male.    Chief Complaint: Exposure to STD (yellow/green discharge, x 5-6 days)    New patient here for STD testing today. He had unprotected sex with a female new partner approx 1 mo ago. He started having yellow/green penile discharge approx 5 days ago- noticed it more if he "squeezes" it out. He also hs noticed some "bumps" on his penis for the past few years which are nontender. He denies any major PMH except for GERD/gastritis which is controlled on Pepcid otc daily. He has been tested in the last year for HIV which was negative. He has been previously treated for trichomonas.     Exposure to STD   The patient's primary symptoms include genital lesions (not painful ), penile discharge and penile pain. The patient's pertinent negatives include no genital itching, scrotal swelling or testicular pain. This is a new problem. The current episode started in the past 7 days. The problem occurs daily. The problem has been unchanged. The patient is experiencing no pain. Pertinent negatives include no abdominal pain, chest pain, chills, constipation, coughing, diarrhea, discolored urine, dysuria, fever, flank pain, frequency, headaches, hematuria, joint pain, nausea, painful intercourse, rash, shortness of breath, sore throat, urgency or vomiting. The penile discharge was yellow. The symptoms are aggravated by tactile pressure. He has tried nothing for the symptoms. He is sexually active. He inconsistently uses condoms. It is unknown whether or not his partner has an STD. There is no history of chlamydia, gonorrhea, herpes simplex, HIV or syphilis. (Trichomonas)       Family History   Problem Relation Age of Onset    No Known Problems Mother     No Known Problems Father       Social History     Socioeconomic History    Marital status: Single     Spouse name: Not on file    Number of children: Not on file    Years of education: Not on file    " Highest education level: Not on file   Occupational History    Not on file   Social Needs    Financial resource strain: Not on file    Food insecurity:     Worry: Not on file     Inability: Not on file    Transportation needs:     Medical: Not on file     Non-medical: Not on file   Tobacco Use    Smoking status: Never Smoker   Substance and Sexual Activity    Alcohol use: No    Drug use: Yes     Types: Marijuana     Comment: current everyday use    Sexual activity: Yes     Partners: Female     Birth control/protection: None   Lifestyle    Physical activity:     Days per week: Not on file     Minutes per session: Not on file    Stress: Not on file   Relationships    Social connections:     Talks on phone: Not on file     Gets together: Not on file     Attends Scientologist service: Not on file     Active member of club or organization: Not on file     Attends meetings of clubs or organizations: Not on file     Relationship status: Not on file   Other Topics Concern    Not on file   Social History Narrative    Not on file     Current Outpatient Medications   Medication Sig Dispense Refill    famotidine (PEPCID) 10 MG tablet Take 10 mg by mouth 2 (two) times daily.      doxycycline (VIBRA-TABS) 100 MG tablet Take 1 tablet (100 mg total) by mouth 2 (two) times daily. for 7 days 14 tablet 0     No current facility-administered medications for this visit.      Review of patient's allergies indicates:   Allergen Reactions    Pcn [penicillins]       Past Medical History:   Diagnosis Date    GERD (gastroesophageal reflux disease)      History reviewed. No pertinent surgical history.    Review of Systems   Constitutional: Negative for chills, fever and unexpected weight change.   HENT: Negative for congestion, ear pain and sore throat.    Respiratory: Negative for cough and shortness of breath.    Cardiovascular: Negative for chest pain.   Gastrointestinal: Negative for abdominal pain, constipation, diarrhea,  "nausea and vomiting.   Endocrine: Negative for polydipsia and polyuria.   Genitourinary: Positive for discharge, genital sores and penile pain. Negative for decreased urine volume, difficulty urinating, dysuria, flank pain, frequency, hematuria, penile swelling, scrotal swelling, testicular pain and urgency.   Musculoskeletal: Negative for joint pain and myalgias.   Skin: Negative for rash and wound.   Neurological: Negative for dizziness and headaches.   Hematological: Negative for adenopathy.   Psychiatric/Behavioral: Negative for dysphoric mood. The patient is not nervous/anxious.       OBJECTIVE:      Vitals:    01/30/20 1053   BP: 112/60   BP Location: Left arm   Patient Position: Sitting   BP Method: Large (Manual)   Pulse: 73   Temp: 97.9 °F (36.6 °C)   TempSrc: Oral   SpO2: 99%   Weight: 68.1 kg (150 lb 3.2 oz)   Height: 6' 1" (1.854 m)     Physical Exam   Constitutional: He is oriented to person, place, and time. He appears well-developed and well-nourished. No distress.   HENT:   Head: Normocephalic and atraumatic.   Mouth/Throat: Oropharynx is clear and moist.   Eyes: Pupils are equal, round, and reactive to light. Conjunctivae are normal.   Neck: Normal range of motion. Neck supple.   Cardiovascular: Normal rate, regular rhythm and normal heart sounds.   No murmur heard.  Pulmonary/Chest: Effort normal and breath sounds normal. No respiratory distress. He has no wheezes. He has no rales.   Abdominal: Soft. Bowel sounds are normal. He exhibits no distension and no mass. There is no tenderness.   Genitourinary: Testes normal. Right testis shows no mass, no swelling and no tenderness. Right testis is descended. Left testis shows no mass, no swelling and no tenderness. Left testis is descended. Circumcised. No penile tenderness. Discharge (small amt at tip only ) found.         Genitourinary Comments: AMARILIS Aquino present for exam    Lymphadenopathy:     He has no cervical adenopathy.   Neurological: He is " alert and oriented to person, place, and time.   Skin: Skin is warm and dry. No rash noted. He is not diaphoretic.   Psychiatric: He has a normal mood and affect. His behavior is normal.   Nursing note and vitals reviewed.     Assessment:       1. STD exposure    2. Genital warts    3. Penile discharge        Plan:       STD exposure  -     Hepatitis panel, acute; Future; Expected date: 01/30/2020  -     RPR; Future; Expected date: 01/30/2020  -     HIV 1/2 Ag/Ab (4th Gen); Future; Expected date: 01/30/2020  -     C. trachomatis/N. gonorrhoeae by AMP DNA; Future; Expected date: 01/30/2020  -     Trichomonas Vaginalis, SAAD; Future; Expected date: 01/30/2020  -     doxycycline (VIBRA-TABS) 100 MG tablet; Take 1 tablet (100 mg total) by mouth 2 (two) times daily. for 7 days  Dispense: 14 tablet; Refill: 0    *will tx with antibiotics and pt to go to lab today for testing as discussed; advised no sex x 7 days while on tx; will use condoms in the future at every encounter     Genital warts  -     Ambulatory Referral to Urology    Penile discharge  -     doxycycline (VIBRA-TABS) 100 MG tablet; Take 1 tablet (100 mg total) by mouth 2 (two) times daily. for 7 days  Dispense: 14 tablet; Refill: 0        Follow up in about 3 months (around 4/30/2020) for annual, recheck STD .      1/30/2020 CIARA Ibarra, FNP

## 2020-02-04 LAB
HAV IGM SERPL QL IA: NEGATIVE
HBV CORE IGM SERPL QL IA: NEGATIVE
HBV SURFACE AG SERPL QL IA: NEGATIVE
HCV AB S/CO SERPL IA: <0.1 S/CO RATIO (ref 0–0.9)
HIV 1+2 AB+HIV1 P24 AG SERPL QL IA: NON REACTIVE
RPR SER QL: NON REACTIVE
T VAGINALIS DNA SPEC QL NAA+PROBE: NEGATIVE

## 2020-02-06 NOTE — PROGRESS NOTES
Please notify patient that results are normal- please tell him the chlamydia and gonorrhea test were ordered wrong per my error, so these were not run; I plan to recheck them in 3 mo as we discussed but he likely had an infection with one of these; please remind him to use protection with condoms at all times; also make sure his symptoms are gone and let me know if not

## 2020-02-13 ENCOUNTER — TELEPHONE (OUTPATIENT)
Dept: FAMILY MEDICINE | Facility: CLINIC | Age: 30
End: 2020-02-13

## 2020-02-13 NOTE — TELEPHONE ENCOUNTER
----- Message from BORA Garza sent at 2/6/2020  1:28 PM CST -----  Please notify patient that results are normal- please tell him the chlamydia and gonorrhea test were ordered wrong per my error, so these were not run; I plan to recheck them in 3 mo as we discussed but he likely had an infection with one of these; please remind him to use protection with condoms at all times; also make sure his symptoms are gone and let me know if not

## 2021-09-09 NOTE — ASSESSMENT & PLAN NOTE
Discharge Summary  [facility]    Patient Name Xu Rudolph   MRN: [id number]   YOB: 1956       Admit date: 8/31/2021   Discharge date:  9/9/2021       Disposition:                   Home with home health care    Admitting Physician: Brayan Apple MD   Primary care provider: BENITEZ Zhu  Discharge Physician: Brayan Apple MD    Primary Discharge Diagnoses:  1. Acute respiratory failure with hypoxia:  Likely due to pneumonia, atelectasis, COPD exacerbation.  Currently weaned down to room air both at rest and with exertion.  She improved with metanebulizer therapy.  Will continue incentive spirometry, flutter device, DuoNebs as below.  2. Community-acquired pneumonia: Completed 7 days of IV levofloxacin.  Legionella, strep pneumo, blood cultures negative.  3. Diffuse wheezing concerning for COPD.  Lung sounds are much better and she is moving more air without significant wheezing.  Will continue duo nebs q.i.d. p.r.n. as an outpatient, nebulizer machine was provided to the patient.  Unfortunately we were unable to do any steroids as she previouly anaphylaxis/severe chest pain with prednisone so unable to use any steroids  4. Hypokalemia.  Supplemented  5. Asymptomatic bacteriuria.  Mixed suzi on culture  6. Hypertension now with intermittent hypotension.   blood pressure medications were adjusted.  Will continue to monitor as outpatient, restart antihypertensives as needed.  7. Left chest musculoskeletal strain:  P.r.n.  Short course of Norco Norco, diclofenac cream, ibuprofen.  No suspicion for ACS.  8. Regarding the rest of the patient's chronic medical conditions including GERD, hyperlipidemia, they were monitored during this admission and prior to admission medications will be continued as indicated.     Principal Problem:    Pneumonia due to infectious organism  Active Problems:    Hyperlipidemia    GERD (gastroesophageal reflux disease)    Essential  Likely etiology of patients nausea and vomiting. IV rocephin and monitor urine culture for growth and sensitivity.     hypertension    Prediabetes    Stage 3a chronic kidney disease (CMS/[facility])  Resolved Problems:    * No resolved hospital problems. *      Past Medical History:    Reflex sympathetic dystrophy of both lower ext*               Tremor                                                        Hyperlipidemia                                                GERD (gastroesophageal reflux disease)                        Endometriosis                                                   Comment: Status post total hysterectomy    Kidney stones                                                 Rib fractures                                                   Comment: 2013 related to a fall    Tobacco abuse                                                 Vitamin B12 deficiency                                        Migraines                                                     Osteoporosis                                    1/9/2014      Raynaud's phenomenon                                          Optic neuropathy                                2004          Osteopenia                                                    Colon polyps                                    7/13/2020     PONV (postoperative nausea and vomiting)                      Consultations:  None  Transfusions:  None  Procedures:  None    Hospital Course (see H&P for details of admission):    65-year-old female admitted for pneumonia and COPD exacerbation.  She was treated with levofloxacin as well as with duo nebs, fortunately steroids cannot be used.  She had a prolonged recovery course and improved with pulmonary hygiene as well as metanebulizer therapy.  She was eventually weaned to room air and was discharged in stable condition    Code status: Full Resuscitation    Discharge Labs:  Recent Labs   Lab 09/09/21  0600 09/08/21  0543 09/07/21  0615 09/03/21  0640   SODIUM 138 141 139 141   POTASSIUM 4.3 4.1 4.1 4.0   CHLORIDE 103 105 104 108*   CO2 27 29 27 27   BUN 18 15 16  11   CREATININE 0.92 0.88 0.89 0.74   GLUCOSE 109* 103* 105* 95   MG  --   --   --  1.9   WBC 7.5 6.4 7.5 5.7   HGB 10.4* 10.0* 10.3* 9.6*   HCT 33.7* 32.6* 33.8* 31.1*    408 419 293       Microbiology Results  (Last 10 results in the past 7 days)    Specimen   Gram Smear   Culture Result   Status         09/06/21  1716          Adequate quality specimen. Acute inflammation with moderate/many neutrophils present.           Few Gram positive cocci.               Significant Diagnostic Studies and Procedures:  XR CHEST AP OR PA - PORTABLE    Result Date: 8/31/2021  Narrative: XR CHEST AP OR PA HISTORY: cough COMPARISON: CT chest dated 12/20/2020 FINDINGS: Normal cardiomediastinal silhouette. Osseous structures reveal no acute abnormality. Upper abdomen and soft tissues are unremarkable. Patchy opacities at left lung base. Few linear opacities in the left midlung. Pulmonary vasculature appears normal. No pleural effusion or pneumothorax. Partially visualized hiatal hernia.     Impression: IMPRESSION: 1.  Left basilar patchy opacities, may represent developing focal infiltrate or may be due to focal atelectasis with prominent pericardial fat pad.    XR LUMBAR SPINE AP LATERAL AND OBLIQUES    Result Date: 8/30/2021  Narrative: EXAM: XR LUMBAR SPINE AP LATERAL AND OBLIQUES HISTORY:  From order:  Other symptoms and signs involving the nervous system, Unspecified fall, initial encounter, Unspecified place in unspecified non-institutional (private) residence as the place of occurrence of the external cause, Pain in right hip COMPARISON:  February 19, 2020  FINDINGS:  The study was performed on 8/27/2021 2:36 PM Four view study, AP lateral, right and left oblique views. No fracture. Mild L4-5 disc narrowing without spurring. No bony destruction.     Impression: IMPRESSION:  Mild narrowing of the L4-5 disc similar to prior study No fracture finding     XR Pelvis 1 View    Result Date: 8/31/2021  Narrative: XR PELVIS 1  OR 2 VW HISTORY: fall COMPARISON: CT right hip performed earlier today. FINDINGS: Bones show normal mineralization and alignment. No fracture, subluxation or dislocation. No bony erosion or sclerosis. Soft tissues are unremarkable. Mild degenerative change in both hip joints.     Impression: IMPRESSION: No acute radiographic abnormality in pelvis.     XR HIP 2 VW RIGHT    Result Date: 8/30/2021  Narrative: EXAMINATION: XR HIP 2 VW RIGHT HISTORY: Unspecified fall, initial encounter, Unspecified place in unspecified non-institutional (private) residence as the place of occurrence of the external cause, Pain in right hip COMPARISON: None     Impression: FINDINGS/IMPRESSION: There is mild right hip degenerative change with acetabular over coverage.     CT HIP RIGHT    Result Date: 8/31/2021  Narrative: EXAM: CT HIP WO CONTRAST RIGHT HISTORY: Fracture, hip, negative xray recently worsening pain COMPARISONS:  X-ray performed 8/27/2021. CT chest abdomen pelvis performed 12/20/2020. TECHNIQUE: CT of the right hip was performed without contrast. Multiple axial images were obtained from the right iliac wing through the proximal femoral diaphysis. Coronal and sagittal reformatted images were also reviewed. FINDINGS:  There is mild osteopenia. This limits sensitivity for nondisplaced fractures. There is a bone island within the right femoral neck. Small marginal osteophytes are identified along the margins of the acetabulum. There is mild posterior joint space narrowing, with subchondral sclerosis. There is no hip joint effusion appreciated. Visualized musculature appears grossly normal. No significant soft tissue injury is identified. The urinary bladder is partially visualized. There are multiple calcified pelvic phleboliths. Vacuum phenomenon is identified along the right sacroiliac joint.     Impression: IMPRESSION: 1. No fracture identified. 2. Mild to moderate degenerative changes of the right hip.     CT HEAD WO  CONTRAST    Result Date: 8/31/2021  Narrative: CT HEAD WO CONTRAST INDICATION: Head trauma, mod-severe, fall several days. COMPARISON: None. TECHNIQUE: Axial images were acquired through the head without intravenous contrast. Sagittal and coronal reformats were reviewed. FINDINGS: No acute intracranial hemorrhage, mass effect, midline shift, or pathologic extra-axial fluid collection identified.  Size and configuration of the ventricles and sulci are considered within normal limits for age.  Mild scattered periventricular low-attenuation changes are nonspecific, but of the type typically ascribed to microvascular ischemic change in a patient of this age.  No CT evidence for evolving infarct in a major intracranial vascular territory, although MRI is more sensitive for detection of acute ischemia.  Visualized paranasal sinuses and mastoid air cells are clear. The calvarium and skull base are unremarkable.     Impression: IMPRESSION: No acute findings. Mild periventricular microvascular chronic ischemic changes.        Pending  Results:  Unresulted Labs (From admission, onward)     Start     Ordered    09/02/21 0600  CBC with Automated Differential  AM DRAW,   Routine       09/01/21 1351    09/02/21 0600  Basic Metabolic Panel  AM DRAW,   Routine       09/01/21 1351              Unresulted Procedure (From admission, onward)    None          Discharge Exam:  Blood pressure 123/58, pulse 96, temperature 97.8 °F (36.6 °C), temperature source Temporal, resp. rate 16, height 5' 4.5\" (1.638 m), weight 95.2 kg, SpO2 92 %.  General - no acute distress  HEENT - Normocephalic and atraumatic.  Cor - Regular rate and rhythm.  No murmur rubs or gallops.  Normal S1, S2.  Reproducible tenderness in the left chest to palpation  Pulm - clear to auscultation bilaterally with some faint scattered rhonchi, no wheezing.  Fair air movement.  No crackles.  No respiratory distress.  Abd - Soft, nontender, nondistended, normoactive bowel  sounds  Ext - Warm without clubbing or cyanosis.  No pedal edema.  Skin - No rashes or lesions.  Warm and dry.   Neuro - Alert and awake. Strength and sensation grossly intact, moving all extremities spontaneously. No gross focal deficits        Patient Discharge Instructions:   1. Activity: activity as tolerated  2. Diet: Regular Diet  One Time Diet Regular; Ham, Egg And Cheese Breakfast [place], Fresh Fruit Cup, Regular Coffee, Creamer  3. Wound Care:  as directed  Wound Arm Left Interior/Inner Skin Tear (Active)   Date First Assessed: 09/07/21   Present on Hospital Admission: No  Location: Arm  Laterality: Left  Modifier: Interior/Inner  Primary Wound Type: Skin Tear      Assessments 9/7/2021 10:14 AM 9/9/2021  9:12 AM   Wound Exudate None None   Wound Bed/Tissue Type -- Intact   Periwound Condition -- Rubor   Wound Edge Scabbed --   Wound Dressing None None       No Linked orders to display        4. Discharge Medications:  Current Discharge Medication List      START taking these medications    Details   albuterol 108 (90 Base) MCG/ACT inhaler Inhale 2 puffs into the lungs Every 4 hours as needed for Shortness of Breath or Wheezing.  Qty: 18 g, Refills: 12      benzonatate (TESSALON PERLES) 100 MG capsule Take 1 capsule by mouth 3 times daily as needed for Cough.  Qty: 30 capsule, Refills: 0      diclofenac (VOLTAREN) 1 % gel Apply 2 g topically 4 times daily as needed (left chest).  Qty: 100 g, Refills: 0      guaiFENesin-DM (MUCINEX DM)  MG per 12 hr tablet Take 2 tablets by mouth every 12 hours for 10 days.  Qty: 40 tablet, Refills: 0      ipratropium-albuterol (DUONEB) 0.5-2.5 (3) MG/3ML nebulizer solution Take 3 mLs by nebulization 4 times daily.  Qty: 360 mL, Refills: 3         CONTINUE these medications which have CHANGED    Details   HYDROcodone-acetaminophen (NORCO) 5-325 MG per tablet Take 1 tablet by mouth every 6 hours as needed for Pain.  Qty: 10 tablet, Refills: 0    Associated Diagnoses:  Flank pain, chronic      propranolol (INDERAL LA) 60 MG 24 hr capsule Take 1 capsule by mouth daily. Do not start before September 10, 2021.  Qty: 30 capsule, Refills: 0         CONTINUE these medications which have NOT CHANGED    Details   !! nortriptyline (PAMELOR) 25 MG capsule Take 1 capsule by mouth nightly.  Qty: 90 capsule, Refills: 1      pregabalin (LYRICA) 300 MG capsule Take 1 capsule by mouth 2 times daily.  Qty: 60 capsule, Refills: 3    Comments: Dosage changed on 07/29/2021.  Please disregard 200 mg capsule dosage.  Please let her  today 7/29/2021.      methocarbamol (ROBAXIN) 500 MG tablet Take 1 tablet by mouth 4 times daily.  Qty: 120 tablet, Refills: 1    Associated Diagnoses: Chronic right-sided thoracic back pain      !! nortriptyline (PAMELOR) 75 MG capsule Take 1 capsule by mouth nightly.  Qty: 90 capsule, Refills: 0      omeprazole (PriLOSEC) 40 MG capsule TAKE ONE CAPSULE BY MOUTH TWICE DAILY  Qty: 180 capsule, Refills: 1    Associated Diagnoses: GERD without esophagitis      ibandronate (Boniva) 150 MG tablet Take 1 tablet by mouth every 30 days.  Qty: 2 tablet, Refills: 1    Comments: Replacing alendronate. 2/4/2021      Acetaminophen Extra Strength 500 MG tablet Take 1-2 tablets by mouth every 6 hours as needed for Pain. Max 4000 mg a day  Qty: 30 tablet, Refills: 0    Associated Diagnoses: Flank pain, chronic      ibuprofen (MOTRIN) 600 MG tablet Take 600 mg by mouth every 6 hours as needed for Pain.      Ascorbic Acid (VITAMIN C) 500 MG tablet Take 500 mg by mouth daily.       diphenhydrAMINE HCl (BENADRYL ALLERGY PO) Take 2 tablets by mouth nightly as needed (sleep).        !! - Potential duplicate medications found. Please discuss with provider.      STOP taking these medications       amLODIPine (NORVASC) 10 MG tablet Comments:   Reason for Stopping:         hydrochlorothiazide (MICROZIDE) 12.5 MG capsule Comments:   Reason for Stopping:                    ALLERGIES:  Penicillin g, Prednisone, Sulfa antibiotics, Verapamil, Statins, and Zithromax [azithromycin]     Follow-up:  BENITEZ Zhu  [address] [place] [address]  [phone]    In 1 week      Future Appointments   Date Time Provider Department Center   9/15/2021  8:30 AM BENITEZ Zhu [facility]     Time spent on discharge was 35 minutes.  Discharge discussed with  patient, nursing, case management, respiratory therapy    This note was completed using a voice recognition software. Although all efforts were made to proofread, inadvertent inaccuracies might have occurred. If there are any questions or concerns regarding the content of this note, please do not hesitate to contact me directly.    Signed:  Brayan Apple MD  9/9/2021  12:59 PM

## 2022-07-04 ENCOUNTER — HOSPITAL ENCOUNTER (EMERGENCY)
Facility: HOSPITAL | Age: 32
Discharge: HOME | End: 2022-07-04
Attending: EMERGENCY MEDICINE
Payer: MEDICAID

## 2022-07-04 VITALS
DIASTOLIC BLOOD PRESSURE: 86 MMHG | HEART RATE: 75 BPM | TEMPERATURE: 98 F | BODY MASS INDEX: 21.17 KG/M2 | SYSTOLIC BLOOD PRESSURE: 134 MMHG | OXYGEN SATURATION: 99 % | HEIGHT: 74 IN | WEIGHT: 165 LBS | RESPIRATION RATE: 18 BRPM

## 2022-07-04 DIAGNOSIS — K04.7 DENTAL ABSCESS: Primary | ICD-10-CM

## 2022-07-04 PROCEDURE — 99283 EMERGENCY DEPT VISIT LOW MDM: CPT

## 2022-07-04 PROCEDURE — 25000003 PHARM REV CODE 250: Performed by: EMERGENCY MEDICINE

## 2022-07-04 RX ORDER — HYDROCODONE BITARTRATE AND ACETAMINOPHEN 5; 325 MG/1; MG/1
1 TABLET ORAL EVERY 4 HOURS PRN
Qty: 12 TABLET | Refills: 0 | Status: SHIPPED | OUTPATIENT
Start: 2022-07-04

## 2022-07-04 RX ORDER — CIPROFLOXACIN 500 MG/1
500 TABLET ORAL 2 TIMES DAILY
Qty: 14 TABLET | Refills: 0 | Status: SHIPPED | OUTPATIENT
Start: 2022-07-04 | End: 2022-07-11

## 2022-07-04 RX ORDER — HYDROCODONE BITARTRATE AND ACETAMINOPHEN 5; 325 MG/1; MG/1
1 TABLET ORAL
Status: COMPLETED | OUTPATIENT
Start: 2022-07-04 | End: 2022-07-04

## 2022-07-04 RX ADMIN — HYDROCODONE BITARTRATE AND ACETAMINOPHEN 1 TABLET: 5; 325 TABLET ORAL at 01:07

## 2022-07-04 NOTE — ED PROVIDER NOTES
Encounter Date: 7/4/2022       History     Chief Complaint   Patient presents with    Otalgia     RT VS DENTAL X [address] [place]    Dental Problem     Patient presents complaining of dental pain.  Patient states he has multiple missing teeth and has poor dentition.  He complains of pain radiating to the right ear.  Symptoms have been present for the last couple days.  He denies fever chills.  He denies trismus.  He denies difficulty swallowing.        Review of patient's allergies indicates:   Allergen Reactions    Pcn [penicillins]      Past Medical History:   Diagnosis Date    GERD (gastroesophageal reflux disease)      No past surgical history on file.  Family History   Problem Relation Age of Onset    No Known Problems Mother     No Known Problems Father      Social History     Tobacco Use    Smoking status: Never Smoker   Substance Use Topics    Alcohol use: No    Drug use: Yes     Types: Marijuana     Comment: current everyday use     Review of Systems   All other systems reviewed and are negative.      Physical Exam     Initial Vitals [07/04/22 1325]   BP Pulse Resp Temp SpO2   134/86 75 18 98.2 °F (36.8 °C) 99 %      MAP       --         Physical Exam    Nursing note and vitals reviewed.  Constitutional: He appears well-developed and well-nourished. He is not diaphoretic. No distress.   Pleasant, polite.   HENT:   Head: Normocephalic and atraumatic.   Multiple poor dentition.  Patient is tender to palpation over tooth number 4.  There is no cheek erythema warmth or cellulitis.  There is no definitive abscess.  The right TM is normal.    Eyes: EOM are normal.   Neck: Neck supple.   Normal range of motion.  Pulmonary/Chest: No respiratory distress.   Musculoskeletal:      Cervical back: Normal range of motion and neck supple.     Neurological: He is alert.   Skin: Skin is warm and dry.   Psychiatric: He has a normal mood and affect. His behavior is normal. Judgment and thought content normal.         ED  Course   Procedures  Labs Reviewed - No data to display       Imaging Results    None          Medications   HYDROcodone-acetaminophen 5-325 mg per tablet 1 tablet (1 tablet Oral Given 7/4/22 2309)     Medical Decision Making:   Initial Assessment:   No apparent distress  Differential Diagnosis:   Dental abscess  ED Management:  Patient given prescription for antibiotics and analgesia advised to follow-up with dentist.  Patient multiple poor teeth.  There is no trismus.  There is no airway involvement.  Patient be discharged stable condition.  Detailed return precautions discussed                      Clinical Impression:   Final diagnoses:  [K04.7] Dental abscess (Primary)          ED Disposition Condition    Discharge Stable        ED Prescriptions     Medication Sig Dispense Start Date End Date Auth. Provider    ciprofloxacin HCl (CIPRO) 500 MG tablet Take 1 tablet (500 mg total) by mouth 2 (two) times daily. for 7 days 14 tablet 7/4/2022 7/11/2022 Kennedy Burrows MD    HYDROcodone-acetaminophen (NORCO) 5-325 mg per tablet Take 1 tablet by mouth every 4 (four) hours as needed. 12 tablet 7/4/2022  Kennedy Burrows MD        Follow-up Information     Follow up With Specialties Details Why Contact Info    [facility] In 1 week  [address]  [address]  [facility] [address]  [phone]      Your dentist asaliang Burrows MD  07/04/22 0859

## 2022-08-17 ENCOUNTER — HOSPITAL ENCOUNTER (EMERGENCY)
Facility: HOSPITAL | Age: 32
Discharge: HOME | End: 2022-08-17
Attending: EMERGENCY MEDICINE
Payer: MEDICAID

## 2022-08-17 VITALS
WEIGHT: 145 LBS | HEART RATE: 94 BPM | SYSTOLIC BLOOD PRESSURE: 123 MMHG | DIASTOLIC BLOOD PRESSURE: 58 MMHG | BODY MASS INDEX: 18.62 KG/M2 | OXYGEN SATURATION: 99 % | RESPIRATION RATE: 18 BRPM | TEMPERATURE: 99 F

## 2022-08-17 DIAGNOSIS — R11.2 NAUSEA AND VOMITING, INTRACTABILITY OF VOMITING NOT SPECIFIED, UNSPECIFIED VOMITING TYPE: Primary | ICD-10-CM

## 2022-08-17 DIAGNOSIS — E86.0 DEHYDRATION: ICD-10-CM

## 2022-08-17 LAB
ALBUMIN SERPL BCP-MCNC: 6.2 G/DL (ref 3.5–5.2)
ALP SERPL-CCNC: 116 U/L (ref 55–135)
ALT SERPL W/O P-5'-P-CCNC: 43 U/L (ref 10–44)
ANION GAP SERPL CALC-SCNC: 19 MMOL/L (ref 8–16)
AST SERPL-CCNC: 48 U/L (ref 10–40)
BACTERIA #/AREA URNS HPF: NEGATIVE /HPF
BASOPHILS # BLD AUTO: 0.03 K/UL (ref 0–0.2)
BASOPHILS NFR BLD: 0.2 % (ref 0–1.9)
BILIRUB SERPL-MCNC: 1 MG/DL (ref 0.1–1)
BILIRUB UR QL STRIP: ABNORMAL
BUN SERPL-MCNC: 28 MG/DL (ref 6–20)
CALCIUM SERPL-MCNC: 10.3 MG/DL (ref 8.7–10.5)
CHLORIDE SERPL-SCNC: 98 MMOL/L (ref 95–110)
CLARITY UR: CLEAR
CO2 SERPL-SCNC: 20 MMOL/L (ref 23–29)
COLOR UR: YELLOW
CREAT SERPL-MCNC: 1.6 MG/DL (ref 0.5–1.4)
DIFFERENTIAL METHOD: ABNORMAL
EOSINOPHIL # BLD AUTO: 0 K/UL (ref 0–0.5)
EOSINOPHIL NFR BLD: 0 % (ref 0–8)
ERYTHROCYTE [DISTWIDTH] IN BLOOD BY AUTOMATED COUNT: 11.8 % (ref 11.5–14.5)
EST. GFR  (NO RACE VARIABLE): 59.1 ML/MIN/1.73 M^2
GLUCOSE SERPL-MCNC: 118 MG/DL (ref 70–110)
GLUCOSE UR QL STRIP: NEGATIVE
HCT VFR BLD AUTO: 41.9 % (ref 40–54)
HGB BLD-MCNC: 14.2 G/DL (ref 14–18)
HGB UR QL STRIP: ABNORMAL
HYALINE CASTS #/AREA URNS LPF: 55 /LPF
IMM GRANULOCYTES # BLD AUTO: 0.05 K/UL (ref 0–0.04)
IMM GRANULOCYTES NFR BLD AUTO: 0.3 % (ref 0–0.5)
KETONES UR QL STRIP: ABNORMAL
LEUKOCYTE ESTERASE UR QL STRIP: NEGATIVE
LIPASE SERPL-CCNC: 25 U/L (ref 4–60)
LYMPHOCYTES # BLD AUTO: 1.2 K/UL (ref 1–4.8)
LYMPHOCYTES NFR BLD: 7.6 % (ref 18–48)
MCH RBC QN AUTO: 33.8 PG (ref 27–31)
MCHC RBC AUTO-ENTMCNC: 33.9 G/DL (ref 32–36)
MCV RBC AUTO: 100 FL (ref 82–98)
MICROSCOPIC COMMENT: ABNORMAL
MONOCYTES # BLD AUTO: 0.8 K/UL (ref 0.3–1)
MONOCYTES NFR BLD: 4.9 % (ref 4–15)
NEUTROPHILS # BLD AUTO: 13.8 K/UL (ref 1.8–7.7)
NEUTROPHILS NFR BLD: 87 % (ref 38–73)
NITRITE UR QL STRIP: NEGATIVE
NRBC BLD-RTO: 0 /100 WBC
PH UR STRIP: 6 [PH] (ref 5–8)
PLATELET # BLD AUTO: 215 K/UL (ref 150–450)
PMV BLD AUTO: 9.4 FL (ref 9.2–12.9)
POTASSIUM SERPL-SCNC: 3.6 MMOL/L (ref 3.5–5.1)
PROT SERPL-MCNC: 10.3 G/DL (ref 6–8.4)
PROT UR QL STRIP: ABNORMAL
RBC # BLD AUTO: 4.2 M/UL (ref 4.6–6.2)
RBC #/AREA URNS HPF: 3 /HPF (ref 0–4)
SODIUM SERPL-SCNC: 137 MMOL/L (ref 136–145)
SP GR UR STRIP: >1.03 (ref 1–1.03)
SQUAMOUS #/AREA URNS HPF: 7 /HPF
URN SPEC COLLECT METH UR: ABNORMAL
UROBILINOGEN UR STRIP-ACNC: NEGATIVE EU/DL
WBC # BLD AUTO: 15.81 K/UL (ref 3.9–12.7)
WBC #/AREA URNS HPF: 8 /HPF (ref 0–5)

## 2022-08-17 PROCEDURE — 81001 URINALYSIS AUTO W/SCOPE: CPT | Performed by: EMERGENCY MEDICINE

## 2022-08-17 PROCEDURE — 99285 EMERGENCY DEPT VISIT HI MDM: CPT | Mod: 25

## 2022-08-17 PROCEDURE — 83690 ASSAY OF LIPASE: CPT | Performed by: EMERGENCY MEDICINE

## 2022-08-17 PROCEDURE — 80053 COMPREHEN METABOLIC PANEL: CPT | Performed by: EMERGENCY MEDICINE

## 2022-08-17 PROCEDURE — 85025 COMPLETE CBC W/AUTO DIFF WBC: CPT | Performed by: EMERGENCY MEDICINE

## 2022-08-17 PROCEDURE — 25000003 PHARM REV CODE 250: Performed by: EMERGENCY MEDICINE

## 2022-08-17 PROCEDURE — 96361 HYDRATE IV INFUSION ADD-ON: CPT

## 2022-08-17 PROCEDURE — 96374 THER/PROPH/DIAG INJ IV PUSH: CPT | Mod: 59

## 2022-08-17 PROCEDURE — 25500020 PHARM REV CODE 255: Performed by: EMERGENCY MEDICINE

## 2022-08-17 PROCEDURE — 63600175 PHARM REV CODE 636 W HCPCS: Performed by: EMERGENCY MEDICINE

## 2022-08-17 RX ORDER — DROPERIDOL 2.5 MG/ML
1.25 INJECTION, SOLUTION INTRAMUSCULAR; INTRAVENOUS ONCE
Status: COMPLETED | OUTPATIENT
Start: 2022-08-17 | End: 2022-08-17

## 2022-08-17 RX ORDER — SODIUM CHLORIDE 9 MG/ML
1000 INJECTION, SOLUTION INTRAVENOUS
Status: COMPLETED | OUTPATIENT
Start: 2022-08-17 | End: 2022-08-17

## 2022-08-17 RX ORDER — ONDANSETRON 4 MG/1
4 TABLET, ORALLY DISINTEGRATING ORAL EVERY 8 HOURS PRN
Qty: 10 TABLET | Refills: 0 | OUTPATIENT
Start: 2022-08-17 | End: 2023-02-24

## 2022-08-17 RX ADMIN — DROPERIDOL 1.25 MG: 2.5 INJECTION, SOLUTION INTRAMUSCULAR; INTRAVENOUS at 06:08

## 2022-08-17 RX ADMIN — SODIUM CHLORIDE 1000 ML: 0.9 INJECTION, SOLUTION INTRAVENOUS at 04:08

## 2022-08-17 RX ADMIN — SODIUM CHLORIDE 1000 ML: 0.9 INJECTION, SOLUTION INTRAVENOUS at 07:08

## 2022-08-17 RX ADMIN — IOHEXOL 100 ML: 350 INJECTION, SOLUTION INTRAVENOUS at 06:08

## 2022-08-17 NOTE — ED PROVIDER NOTES
Encounter Date: 8/17/2022       History     Chief Complaint   Patient presents with    Vomiting     Abd pain and cramping     Patient presents emergency department reported nausea vomiting a abdominal cramping no diarrhea is reported patient states he was drinking alcohol last night he does use marijuana on a daily basis he denies any sick contacts no recorded fever chills no blood in his stool no vomiting blood he denies any dysuria urgency or frequency denies any previous abdominal surgeries does report a history of reflux he takes Pepcid AC for this ailment no reported sick contacts at home no significant family history reported        Review of patient's allergies indicates:   Allergen Reactions    Pcn [penicillins]      Past Medical History:   Diagnosis Date    GERD (gastroesophageal reflux disease)      No past surgical history on file.  Family History   Problem Relation Age of Onset    No Known Problems Mother     No Known Problems Father      Social History     Tobacco Use    Smoking status: Never Smoker   Substance Use Topics    Alcohol use: No    Drug use: Yes     Types: Marijuana     Comment: current everyday use     Review of Systems   Constitutional: Negative for chills, fatigue and fever.   HENT: Negative for congestion.    Respiratory: Negative for cough and shortness of breath.    Cardiovascular: Negative for chest pain and leg swelling.   Gastrointestinal: Positive for abdominal pain, nausea and vomiting. Negative for blood in stool, constipation and diarrhea.   Genitourinary: Negative for dysuria and frequency.   All other systems reviewed and are negative.      Physical Exam     Initial Vitals   BP Pulse Resp Temp SpO2   08/17/22 1521 08/17/22 1521 08/17/22 1521 08/17/22 1523 08/17/22 1521   (!) 143/86 70 (!) 22 98.5 °F (36.9 °C) 99 %      MAP       --                Physical Exam    Constitutional: He appears well-developed and well-nourished. No distress.   HENT:   Head: Normocephalic  and atraumatic.   Right Ear: External ear normal.   Left Ear: External ear normal.   Mouth/Throat: Oropharynx is clear and moist.   Eyes: Pupils are equal, round, and reactive to light.   Neck: Neck supple.   Normal range of motion.  Cardiovascular: Normal rate, regular rhythm, S1 normal, S2 normal, normal heart sounds and intact distal pulses.   Pulmonary/Chest: Breath sounds normal.   Abdominal: Abdomen is soft. He exhibits no distension. There is no abdominal tenderness.   Decreased bowel sounds   Musculoskeletal:         General: Normal range of motion.      Cervical back: Normal range of motion and neck supple.     Neurological: He is alert and oriented to person, place, and time. He has normal strength. GCS score is 15. GCS eye subscore is 4. GCS verbal subscore is 5. GCS motor subscore is 6.   Skin: Skin is warm and dry. No rash noted.   Psychiatric: He has a normal mood and affect. His behavior is normal.         ED Course   Procedures  Labs Reviewed   CBC W/ AUTO DIFFERENTIAL - Abnormal; Notable for the following components:       Result Value    WBC 15.81 (*)     RBC 4.20 (*)      (*)     MCH 33.8 (*)     Gran # (ANC) 13.8 (*)     Immature Grans (Abs) 0.05 (*)     Gran % 87.0 (*)     Lymph % 7.6 (*)     All other components within normal limits   COMPREHENSIVE METABOLIC PANEL - Abnormal; Notable for the following components:    CO2 20 (*)     Glucose 118 (*)     BUN 28 (*)     Creatinine 1.6 (*)     Total Protein 10.3 (*)     Albumin 6.2 (*)     AST 48 (*)     Anion Gap 19 (*)     eGFR 59.1 (*)     All other components within normal limits   LIPASE   URINALYSIS, REFLEX TO URINE CULTURE          Imaging Results          CT Abdomen Pelvis With Contrast (Final result)  Result time 08/17/22 18:27:09    Final result by Maverick Serna MD (08/17/22 18:27:09)                 Narrative:    CMS MANDATED QUALITY DATA - CT RADIATION - 436    All CT scans at this facility utilize dose modulation, iterative  reconstruction, and/or weight based dosing when appropriate to reduce radiation dose to as low as reasonably achievable.        Reason: Nausea/vomiting    TECHNIQUE: CT abdomen and pelvis with 100 mL Omnipaque 350.    COMPARISON: 4/12/2018    CT ABDOMEN:  Partially visualized lung bases are clear.    Liver, gallbladder, pancreas, spleen, adrenals, and kidneys are normal. Aorta is normal.    Large and small intestines are unremarkable. No free intraperitoneal gas.    No acute osseous abnormality.    CT PELVIS:  Bladder is normal. No free pelvic fluid. No acute osseous abnormality. Osseous excrescence arising from right anterior inferior iliac spine suggest old avulsion injury at rectus femoris origin.    IMPRESSION:  Negative CT abdomen and pelvis.    Electronically signed by:  Maverick Serna MD  8/17/2022 6:27 PM CDT Workstation: [id number]                               Medications   sodium chloride 0.9% bolus 1,000 mL (0 mLs Intravenous Stopped 8/17/22 1738)   droperidoL injection 1.25 mg (1.25 mg Intravenous Given 8/17/22 1812)   iohexoL (OMNIPAQUE 350) injection 100 mL (100 mLs Intravenous Given 8/17/22 1811)   0.9%  NaCl infusion (1,000 mLs Intravenous New Bag 8/17/22 1902)     Medical Decision Making:   ED Management:  CT scan shows no intra-abdominal abnormalities IV fluids given neb seen and Zofran for symptom control patient does appear to be dehydrated her and elevation in his BUN will administer a 2nd liter of fluids release patient with recommendations to avoid marijuana Zofran for nausea                      Clinical Impression:   Final diagnoses:  [R11.2] Nausea and vomiting, intractability of vomiting not specified, unspecified vomiting type (Primary)  [E86.0] Dehydration          ED Disposition Condition    Discharge Stable        ED Prescriptions     Medication Sig Dispense Start Date End Date Auth. Provider    ondansetron (ZOFRAN-ODT) 4 MG TbDL Take 1 tablet (4 mg total) by mouth every 8 (eight)  hours as needed. 10 tablet 8/17/2022  Ayush Salazar MD        Follow-up Information     Follow up With Specialties Details Why Contact Info    [facility] Call in 1 day  [address]  [address]  [facility] [address]  [phone]             Ayush Salazar MD  08/17/22 6718

## 2022-11-09 ENCOUNTER — HOSPITAL ENCOUNTER (EMERGENCY)
Facility: HOSPITAL | Age: 32
Discharge: HOME | End: 2022-11-10
Attending: EMERGENCY MEDICINE
Payer: MEDICAID

## 2022-11-09 VITALS
BODY MASS INDEX: 18.96 KG/M2 | RESPIRATION RATE: 14 BRPM | HEART RATE: 80 BPM | DIASTOLIC BLOOD PRESSURE: 85 MMHG | WEIGHT: 140 LBS | TEMPERATURE: 98 F | HEIGHT: 72 IN | OXYGEN SATURATION: 99 % | SYSTOLIC BLOOD PRESSURE: 122 MMHG

## 2022-11-09 DIAGNOSIS — R11.2 NAUSEA AND VOMITING, UNSPECIFIED VOMITING TYPE: Primary | ICD-10-CM

## 2022-11-09 DIAGNOSIS — F12.90 MARIJUANA USE: ICD-10-CM

## 2022-11-09 LAB
BILIRUB UR QL STRIP: NEGATIVE
CLARITY UR: CLEAR
COLOR UR: YELLOW
GLUCOSE UR QL STRIP: NEGATIVE
GROUP A STREP, MOLECULAR: NEGATIVE
HGB UR QL STRIP: NEGATIVE
INFLUENZA A, MOLECULAR: NEGATIVE
INFLUENZA B, MOLECULAR: NEGATIVE
KETONES UR QL STRIP: NEGATIVE
LEUKOCYTE ESTERASE UR QL STRIP: NEGATIVE
NITRITE UR QL STRIP: NEGATIVE
PH UR STRIP: 6 [PH] (ref 5–8)
PROT UR QL STRIP: ABNORMAL
SARS-COV-2 RDRP RESP QL NAA+PROBE: NEGATIVE
SP GR UR STRIP: 1.03 (ref 1–1.03)
SPECIMEN SOURCE: NORMAL
URN SPEC COLLECT METH UR: ABNORMAL
UROBILINOGEN UR STRIP-ACNC: NEGATIVE EU/DL

## 2022-11-09 PROCEDURE — U0002 COVID-19 LAB TEST NON-CDC: HCPCS | Performed by: EMERGENCY MEDICINE

## 2022-11-09 PROCEDURE — 99284 EMERGENCY DEPT VISIT MOD MDM: CPT

## 2022-11-09 PROCEDURE — 87502 INFLUENZA DNA AMP PROBE: CPT | Performed by: EMERGENCY MEDICINE

## 2022-11-09 PROCEDURE — 80307 DRUG TEST PRSMV CHEM ANLYZR: CPT | Performed by: EMERGENCY MEDICINE

## 2022-11-09 PROCEDURE — 87651 STREP A DNA AMP PROBE: CPT | Performed by: EMERGENCY MEDICINE

## 2022-11-09 PROCEDURE — 81003 URINALYSIS AUTO W/O SCOPE: CPT | Mod: 59 | Performed by: EMERGENCY MEDICINE

## 2022-11-09 RX ORDER — SIMETHICONE 125 MG
125 TABLET,CHEWABLE ORAL EVERY 6 HOURS PRN
COMMUNITY

## 2022-11-09 RX ORDER — HYDROXYZINE PAMOATE 25 MG/1
25 CAPSULE ORAL 3 TIMES DAILY PRN
COMMUNITY
Start: 2022-10-18

## 2022-11-09 RX ORDER — FAMOTIDINE 10 MG/1
10 TABLET ORAL 2 TIMES DAILY
COMMUNITY
End: 2022-11-10

## 2022-11-09 RX ORDER — SERTRALINE HYDROCHLORIDE 50 MG/1
50 TABLET, FILM COATED ORAL DAILY
COMMUNITY
Start: 2022-10-18

## 2022-11-10 LAB
ALBUMIN SERPL BCP-MCNC: 5.3 G/DL (ref 3.5–5.2)
ALP SERPL-CCNC: 99 U/L (ref 55–135)
ALT SERPL W/O P-5'-P-CCNC: 25 U/L (ref 10–44)
AMPHET+METHAMPHET UR QL: NEGATIVE
ANION GAP SERPL CALC-SCNC: 10 MMOL/L (ref 8–16)
AST SERPL-CCNC: 34 U/L (ref 10–40)
BARBITURATES UR QL SCN>200 NG/ML: NEGATIVE
BASOPHILS # BLD AUTO: 0.03 K/UL (ref 0–0.2)
BASOPHILS NFR BLD: 0.4 % (ref 0–1.9)
BENZODIAZ UR QL SCN>200 NG/ML: NEGATIVE
BILIRUB SERPL-MCNC: 1.2 MG/DL (ref 0.1–1)
BUN SERPL-MCNC: 48 MG/DL (ref 6–20)
BZE UR QL SCN: NEGATIVE
CALCIUM SERPL-MCNC: 9.5 MG/DL (ref 8.7–10.5)
CANNABINOIDS UR QL SCN: ABNORMAL
CHLORIDE SERPL-SCNC: 86 MMOL/L (ref 95–110)
CO2 SERPL-SCNC: 31 MMOL/L (ref 23–29)
CREAT SERPL-MCNC: 1.5 MG/DL (ref 0.5–1.4)
CREAT UR-MCNC: 243 MG/DL (ref 23–375)
DIFFERENTIAL METHOD: ABNORMAL
EOSINOPHIL # BLD AUTO: 0.1 K/UL (ref 0–0.5)
EOSINOPHIL NFR BLD: 1.1 % (ref 0–8)
ERYTHROCYTE [DISTWIDTH] IN BLOOD BY AUTOMATED COUNT: 10.8 % (ref 11.5–14.5)
EST. GFR  (NO RACE VARIABLE): >60 ML/MIN/1.73 M^2
GLUCOSE SERPL-MCNC: 96 MG/DL (ref 70–110)
HCT VFR BLD AUTO: 42.5 % (ref 40–54)
HGB BLD-MCNC: 15.2 G/DL (ref 14–18)
IMM GRANULOCYTES # BLD AUTO: 0.03 K/UL (ref 0–0.04)
IMM GRANULOCYTES NFR BLD AUTO: 0.4 % (ref 0–0.5)
LIPASE SERPL-CCNC: 81 U/L (ref 4–60)
LYMPHOCYTES # BLD AUTO: 3.1 K/UL (ref 1–4.8)
LYMPHOCYTES NFR BLD: 44.5 % (ref 18–48)
MCH RBC QN AUTO: 33.6 PG (ref 27–31)
MCHC RBC AUTO-ENTMCNC: 35.8 G/DL (ref 32–36)
MCV RBC AUTO: 94 FL (ref 82–98)
MONOCYTES # BLD AUTO: 0.8 K/UL (ref 0.3–1)
MONOCYTES NFR BLD: 11.9 % (ref 4–15)
NEUTROPHILS # BLD AUTO: 2.9 K/UL (ref 1.8–7.7)
NEUTROPHILS NFR BLD: 41.7 % (ref 38–73)
NRBC BLD-RTO: 0 /100 WBC
OPIATES UR QL SCN: NEGATIVE
PCP UR QL SCN>25 NG/ML: NEGATIVE
PLATELET # BLD AUTO: 238 K/UL (ref 150–450)
PMV BLD AUTO: 9.2 FL (ref 9.2–12.9)
POTASSIUM SERPL-SCNC: 3.4 MMOL/L (ref 3.5–5.1)
PROT SERPL-MCNC: 9.4 G/DL (ref 6–8.4)
RBC # BLD AUTO: 4.52 M/UL (ref 4.6–6.2)
SODIUM SERPL-SCNC: 127 MMOL/L (ref 136–145)
TOXICOLOGY INFORMATION: ABNORMAL
WBC # BLD AUTO: 7.04 K/UL (ref 3.9–12.7)

## 2022-11-10 PROCEDURE — 80053 COMPREHEN METABOLIC PANEL: CPT | Performed by: EMERGENCY MEDICINE

## 2022-11-10 PROCEDURE — 63600175 PHARM REV CODE 636 W HCPCS: Performed by: EMERGENCY MEDICINE

## 2022-11-10 PROCEDURE — 96361 HYDRATE IV INFUSION ADD-ON: CPT

## 2022-11-10 PROCEDURE — 83690 ASSAY OF LIPASE: CPT | Performed by: EMERGENCY MEDICINE

## 2022-11-10 PROCEDURE — 96375 TX/PRO/DX INJ NEW DRUG ADDON: CPT

## 2022-11-10 PROCEDURE — 96374 THER/PROPH/DIAG INJ IV PUSH: CPT

## 2022-11-10 PROCEDURE — C9113 INJ PANTOPRAZOLE SODIUM, VIA: HCPCS | Performed by: EMERGENCY MEDICINE

## 2022-11-10 PROCEDURE — 85025 COMPLETE CBC W/AUTO DIFF WBC: CPT | Performed by: EMERGENCY MEDICINE

## 2022-11-10 PROCEDURE — 25000003 PHARM REV CODE 250: Performed by: EMERGENCY MEDICINE

## 2022-11-10 RX ORDER — ONDANSETRON 4 MG/1
4 TABLET, FILM COATED ORAL EVERY 6 HOURS PRN
Qty: 20 TABLET | Refills: 1 | Status: SHIPPED | OUTPATIENT
Start: 2022-11-10 | End: 2023-11-10

## 2022-11-10 RX ORDER — FAMOTIDINE 20 MG/1
20 TABLET, FILM COATED ORAL 2 TIMES DAILY
Qty: 20 TABLET | Refills: 0 | OUTPATIENT
Start: 2022-11-10 | End: 2023-02-24

## 2022-11-10 RX ORDER — SODIUM CHLORIDE 9 MG/ML
1000 INJECTION, SOLUTION INTRAVENOUS
Status: COMPLETED | OUTPATIENT
Start: 2022-11-10 | End: 2022-11-10

## 2022-11-10 RX ORDER — POTASSIUM CHLORIDE 750 MG/1
50 CAPSULE, EXTENDED RELEASE ORAL ONCE
Status: COMPLETED | OUTPATIENT
Start: 2022-11-10 | End: 2022-11-10

## 2022-11-10 RX ORDER — PANTOPRAZOLE SODIUM 40 MG/10ML
40 INJECTION, POWDER, LYOPHILIZED, FOR SOLUTION INTRAVENOUS
Status: COMPLETED | OUTPATIENT
Start: 2022-11-10 | End: 2022-11-10

## 2022-11-10 RX ORDER — ONDANSETRON 2 MG/ML
8 INJECTION INTRAMUSCULAR; INTRAVENOUS
Status: COMPLETED | OUTPATIENT
Start: 2022-11-10 | End: 2022-11-10

## 2022-11-10 RX ADMIN — POTASSIUM CHLORIDE 50 MEQ: 750 CAPSULE, EXTENDED RELEASE ORAL at 02:11

## 2022-11-10 RX ADMIN — PANTOPRAZOLE SODIUM 40 MG: 40 INJECTION, POWDER, FOR SOLUTION INTRAVENOUS at 12:11

## 2022-11-10 RX ADMIN — SODIUM CHLORIDE 1000 ML: 0.9 INJECTION, SOLUTION INTRAVENOUS at 01:11

## 2022-11-10 RX ADMIN — ONDANSETRON 8 MG: 2 INJECTION INTRAMUSCULAR; INTRAVENOUS at 12:11

## 2022-11-10 RX ADMIN — SODIUM CHLORIDE, SODIUM LACTATE, POTASSIUM CHLORIDE, AND CALCIUM CHLORIDE 1000 ML: .6; .31; .03; .02 INJECTION, SOLUTION INTRAVENOUS at 01:11

## 2022-11-10 NOTE — ED PROVIDER NOTES
"Encounter Date: 11/9/2022       History     Chief Complaint   Patient presents with    Nausea    Fatigue     Reports "nausea / weakness / vomiting off and on for a week" no fever / no chills / no urine complaints     31-year-old male presented emergency department with nausea and vomiting on and off for the past several months.  Patient admits using marijuana.  Patient also complains of epigastric abdominal pain.  Denies chest pain or shortness of breath or dysuria or hematuria    Review of patient's allergies indicates:   Allergen Reactions    Pcn [penicillins]      Past Medical History:   Diagnosis Date    GERD (gastroesophageal reflux disease)      No past surgical history on file.  Family History   Problem Relation Age of Onset    No Known Problems Mother     No Known Problems Father      Social History     Tobacco Use    Smoking status: Never   Substance Use Topics    Alcohol use: No    Drug use: Yes     Types: Marijuana     Comment: current everyday use     Review of Systems   Constitutional:  Positive for fatigue.   HENT: Negative.     Eyes: Negative.    Respiratory: Negative.     Cardiovascular: Negative.    Gastrointestinal:  Positive for abdominal pain, nausea and vomiting. Negative for blood in stool, constipation, diarrhea and rectal pain.   Endocrine: Negative.    Genitourinary: Negative.    Musculoskeletal: Negative.    Skin: Negative.    Allergic/Immunologic: Negative.    Neurological: Negative.    Hematological: Negative.    Psychiatric/Behavioral:  Negative for agitation.    All other systems reviewed and are negative.    Physical Exam     Initial Vitals [11/09/22 2025]   BP Pulse Resp Temp SpO2   122/85 80 14 98.1 °F (36.7 °C) 99 %      MAP       --         Physical Exam    Nursing note and vitals reviewed.  Constitutional: He appears well-developed and well-nourished.   HENT:   Head: Normocephalic and atraumatic.   Nose: Nose normal.   Eyes: Conjunctivae and EOM are normal.   Neck: No tracheal " deviation present.   Normal range of motion.  Cardiovascular:  Normal rate, regular rhythm, normal heart sounds and intact distal pulses.     Exam reveals no friction rub.       No murmur heard.  Pulmonary/Chest: Breath sounds normal. No respiratory distress. He has no wheezes. He has no rales.   Abdominal: Abdomen is soft. He exhibits no distension. There is abdominal tenderness.   Mild epigastric tenderness   Musculoskeletal:         General: Normal range of motion.      Cervical back: Normal range of motion.     Neurological: He is alert and oriented to person, place, and time. He has normal strength. GCS score is 15. GCS eye subscore is 4. GCS verbal subscore is 5. GCS motor subscore is 6.   Skin: Skin is warm and dry. Capillary refill takes less than 2 seconds.   Psychiatric: He has a normal mood and affect. Thought content normal.       ED Course   Procedures  Labs Reviewed   URINALYSIS, REFLEX TO URINE CULTURE - Abnormal; Notable for the following components:       Result Value    Protein, UA Trace (*)     All other components within normal limits    Narrative:     Specimen Source->Urine   CBC W/ AUTO DIFFERENTIAL - Abnormal; Notable for the following components:    RBC 4.52 (*)     MCH 33.6 (*)     RDW 10.8 (*)     All other components within normal limits   COMPREHENSIVE METABOLIC PANEL - Abnormal; Notable for the following components:    Sodium 127 (*)     Potassium 3.4 (*)     Chloride 86 (*)     CO2 31 (*)     All other components within normal limits   LIPASE - Abnormal; Notable for the following components:    Lipase 81 (*)     All other components within normal limits   DRUG SCREEN PANEL, URINE EMERGENCY - Abnormal; Notable for the following components:    THC Presumptive Positive (*)     All other components within normal limits    Narrative:     Specimen Source->Urine   GROUP A STREP, MOLECULAR   INFLUENZA A AND B ANTIGEN    Narrative:     Specimen Source->Nasopharyngeal Swab   SARS-COV-2 RNA  AMPLIFICATION, QUAL          Imaging Results    None          Medications   lactated ringers bolus 1,000 mL (1,000 mLs Intravenous New Bag 11/10/22 0115)   0.9%  NaCl infusion (has no administration in time range)   potassium chloride CR capsule 50 mEq (has no administration in time range)   pantoprazole injection 40 mg (40 mg Intravenous Given 11/10/22 0015)   ondansetron injection 8 mg (8 mg Intravenous Given 11/10/22 0015)     Medical Decision Making:   Differential Diagnosis:   31-year-old male with epigastric abdominal pain and nausea and vomiting and this could be cyclical vomiting from marijuana use.  Patient advised to stop using marijuana.  Symptoms resolved with treatment in the emergency department.  Patient hydrated and hypokalemia treated.  Sodium chloride given as patient is slightly hyponatremic.  Patient felt better after hydration.  As feeling well discharged with instructions and follow-up and return precautions given  Clinical Tests:   Lab Tests: Reviewed                        Clinical Impression:   Final diagnoses:  [R11.2] Nausea and vomiting, unspecified vomiting type (Primary)  [F12.90] Marijuana use      ED Disposition Condition    Discharge Stable          ED Prescriptions       Medication Sig Dispense Start Date End Date Auth. Provider    ondansetron (ZOFRAN) 4 MG tablet Take 1 tablet (4 mg total) by mouth every 6 (six) hours as needed. 20 tablet 11/10/2022 11/10/2023 Brenden Carrera MD    famotidine (PEPCID) 20 MG tablet Take 1 tablet (20 mg total) by mouth 2 (two) times daily. 20 tablet 11/10/2022 11/10/2023 Brenden Carrera MD          Follow-up Information       Follow up With Specialties Details Why Contact Info    [facility] In 2 days  [address]  [address]  [facility] [address]  [phone]               Brenden Carrera MD  11/10/22 5655

## 2022-11-10 NOTE — DISCHARGE INSTRUCTIONS
Drink lots of fluids.  Rest.  Return to emergency department for worsening symptoms or any problems.  Do not use marijuana as marijuana can cause cyclical vomiting and could be causing your symptoms.

## 2022-11-10 NOTE — ED NOTES
Presents with c/o nausea and fatigue. States the symptoms started around 10/31/22. Reports loss of appetite and loss of taste during this time period. Respirations even et unlabored, no distress noted. Will continue to monitor.

## 2023-02-24 ENCOUNTER — HOSPITAL ENCOUNTER (EMERGENCY)
Facility: HOSPITAL | Age: 33
Discharge: HOME | End: 2023-02-24
Attending: EMERGENCY MEDICINE
Payer: MEDICAID

## 2023-02-24 VITALS
TEMPERATURE: 99 F | BODY MASS INDEX: 18.96 KG/M2 | OXYGEN SATURATION: 100 % | WEIGHT: 140 LBS | HEIGHT: 72 IN | RESPIRATION RATE: 16 BRPM | DIASTOLIC BLOOD PRESSURE: 78 MMHG | SYSTOLIC BLOOD PRESSURE: 118 MMHG | HEART RATE: 89 BPM

## 2023-02-24 DIAGNOSIS — R52 PAIN: ICD-10-CM

## 2023-02-24 DIAGNOSIS — K21.9 GASTROESOPHAGEAL REFLUX DISEASE, UNSPECIFIED WHETHER ESOPHAGITIS PRESENT: Primary | ICD-10-CM

## 2023-02-24 LAB
ALBUMIN SERPL BCP-MCNC: 5.3 G/DL (ref 3.5–5.2)
ALP SERPL-CCNC: 99 U/L (ref 55–135)
ALT SERPL W/O P-5'-P-CCNC: 20 U/L (ref 10–44)
ANION GAP SERPL CALC-SCNC: 11 MMOL/L (ref 8–16)
AST SERPL-CCNC: 25 U/L (ref 10–40)
BASOPHILS # BLD AUTO: 0.03 K/UL (ref 0–0.2)
BASOPHILS NFR BLD: 0.4 % (ref 0–1.9)
BILIRUB SERPL-MCNC: 0.7 MG/DL (ref 0.1–1)
BUN SERPL-MCNC: 32 MG/DL (ref 6–20)
CALCIUM SERPL-MCNC: 10.3 MG/DL (ref 8.7–10.5)
CHLORIDE SERPL-SCNC: 95 MMOL/L (ref 95–110)
CO2 SERPL-SCNC: 35 MMOL/L (ref 23–29)
CREAT SERPL-MCNC: 1.4 MG/DL (ref 0.5–1.4)
DIFFERENTIAL METHOD: ABNORMAL
EOSINOPHIL # BLD AUTO: 0.1 K/UL (ref 0–0.5)
EOSINOPHIL NFR BLD: 1.3 % (ref 0–8)
ERYTHROCYTE [DISTWIDTH] IN BLOOD BY AUTOMATED COUNT: 11.4 % (ref 11.5–14.5)
EST. GFR  (NO RACE VARIABLE): >60 ML/MIN/1.73 M^2
GLUCOSE SERPL-MCNC: 106 MG/DL (ref 70–110)
HCT VFR BLD AUTO: 42.2 % (ref 40–54)
HGB BLD-MCNC: 14.2 G/DL (ref 14–18)
IMM GRANULOCYTES # BLD AUTO: 0.02 K/UL (ref 0–0.04)
IMM GRANULOCYTES NFR BLD AUTO: 0.3 % (ref 0–0.5)
LIPASE SERPL-CCNC: 40 U/L (ref 4–60)
LYMPHOCYTES # BLD AUTO: 2.6 K/UL (ref 1–4.8)
LYMPHOCYTES NFR BLD: 37.7 % (ref 18–48)
MCH RBC QN AUTO: 33 PG (ref 27–31)
MCHC RBC AUTO-ENTMCNC: 33.6 G/DL (ref 32–36)
MCV RBC AUTO: 98 FL (ref 82–98)
MONOCYTES # BLD AUTO: 0.7 K/UL (ref 0.3–1)
MONOCYTES NFR BLD: 9.3 % (ref 4–15)
NEUTROPHILS # BLD AUTO: 3.6 K/UL (ref 1.8–7.7)
NEUTROPHILS NFR BLD: 51 % (ref 38–73)
NRBC BLD-RTO: 0 /100 WBC
PLATELET # BLD AUTO: 257 K/UL (ref 150–450)
PMV BLD AUTO: 8.7 FL (ref 9.2–12.9)
POTASSIUM SERPL-SCNC: 3.7 MMOL/L (ref 3.5–5.1)
PROT SERPL-MCNC: 9.7 G/DL (ref 6–8.4)
RBC # BLD AUTO: 4.3 M/UL (ref 4.6–6.2)
SODIUM SERPL-SCNC: 141 MMOL/L (ref 136–145)
WBC # BLD AUTO: 7 K/UL (ref 3.9–12.7)

## 2023-02-24 PROCEDURE — 25000003 PHARM REV CODE 250: Performed by: EMERGENCY MEDICINE

## 2023-02-24 PROCEDURE — 93010 EKG 12-LEAD: ICD-10-PCS | Mod: ,,, | Performed by: SPECIALIST

## 2023-02-24 PROCEDURE — 80053 COMPREHEN METABOLIC PANEL: CPT | Performed by: NURSE PRACTITIONER

## 2023-02-24 PROCEDURE — 93005 ELECTROCARDIOGRAM TRACING: CPT | Performed by: SPECIALIST

## 2023-02-24 PROCEDURE — 96360 HYDRATION IV INFUSION INIT: CPT

## 2023-02-24 PROCEDURE — 99284 EMERGENCY DEPT VISIT MOD MDM: CPT | Mod: 25

## 2023-02-24 PROCEDURE — 85025 COMPLETE CBC W/AUTO DIFF WBC: CPT | Performed by: NURSE PRACTITIONER

## 2023-02-24 PROCEDURE — 93010 ELECTROCARDIOGRAM REPORT: CPT | Mod: ,,, | Performed by: SPECIALIST

## 2023-02-24 PROCEDURE — 83690 ASSAY OF LIPASE: CPT | Performed by: NURSE PRACTITIONER

## 2023-02-24 RX ORDER — TRAZODONE HYDROCHLORIDE 50 MG/1
50 TABLET ORAL NIGHTLY
COMMUNITY
Start: 2023-02-08

## 2023-02-24 RX ORDER — ONDANSETRON 4 MG/1
4 TABLET, ORALLY DISINTEGRATING ORAL EVERY 6 HOURS PRN
Qty: 12 TABLET | Refills: 0 | Status: SHIPPED | OUTPATIENT
Start: 2023-02-24

## 2023-02-24 RX ORDER — FAMOTIDINE 20 MG/1
40 TABLET, FILM COATED ORAL 2 TIMES DAILY
Qty: 20 TABLET | Refills: 0 | Status: SHIPPED | OUTPATIENT
Start: 2023-02-24 | End: 2024-02-24

## 2023-02-24 RX ORDER — ONDANSETRON 4 MG/1
4 TABLET, ORALLY DISINTEGRATING ORAL EVERY 6 HOURS PRN
Qty: 12 TABLET | Refills: 0 | Status: SHIPPED | OUTPATIENT
Start: 2023-02-24 | End: 2023-02-24 | Stop reason: SDUPTHER

## 2023-02-24 RX ORDER — BUSPIRONE HYDROCHLORIDE 7.5 MG/1
7.5 TABLET ORAL 2 TIMES DAILY
COMMUNITY
Start: 2023-01-12

## 2023-02-24 RX ADMIN — SODIUM CHLORIDE 1000 ML: 0.9 INJECTION, SOLUTION INTRAVENOUS at 06:02

## 2023-02-25 NOTE — ED PROVIDER NOTES
Encounter Date: 2/24/2023       History     Chief Complaint   Patient presents with    Gastroesophageal Reflux     Stop taking PEPCID and now stomach is hurting      32-year-old male presents complaining of indigestion and epigastric discomfort patient reports that he ran out of Pepcid AC at home and began experiencing stomach discomfort over the weekend patient reports he now has Pepcid at home in the is feeling a little better but is still having some nausea and indigestion patient reports epigastric discomfort patient denies fever cough sore throat or any other acute complaints at this time patient reports that he is an occasional drinker and he uses marijuana.    Review of patient's allergies indicates:   Allergen Reactions    Pcn [penicillins]      Past Medical History:   Diagnosis Date    GERD (gastroesophageal reflux disease)      No past surgical history on file.  Family History   Problem Relation Age of Onset    No Known Problems Mother     No Known Problems Father      Social History     Tobacco Use    Smoking status: Never   Substance Use Topics    Alcohol use: No    Drug use: Yes     Types: Marijuana     Comment: current everyday use     Review of Systems   Constitutional:  Negative for fever.   HENT:  Negative for congestion, rhinorrhea, sore throat and trouble swallowing.    Eyes:  Negative for visual disturbance.   Respiratory:  Negative for cough, chest tightness, shortness of breath and wheezing.    Cardiovascular:  Negative for chest pain, palpitations and leg swelling.   Gastrointestinal:  Positive for abdominal pain, diarrhea, nausea and vomiting. Negative for abdominal distention and constipation.   Genitourinary:  Negative for difficulty urinating, dysuria, flank pain and frequency.   Musculoskeletal:  Negative for arthralgias, back pain, joint swelling and neck pain.   Skin:  Negative for color change and rash.   Neurological:  Negative for dizziness, syncope, speech difficulty, weakness,  numbness and headaches.   All other systems reviewed and are negative.    Physical Exam     Initial Vitals [02/24/23 1731]   BP Pulse Resp Temp SpO2   127/75 93 18 99.1 °F (37.3 °C) 98 %      MAP       --         Physical Exam    Nursing note and vitals reviewed.  Constitutional: He appears well-developed and well-nourished. He is not diaphoretic. No distress.   HENT:   Head: Normocephalic and atraumatic.   Right Ear: External ear normal.   Left Ear: External ear normal.   Nose: Nose normal.   Mouth/Throat: Oropharynx is clear and moist. No oropharyngeal exudate.   Eyes: Conjunctivae and EOM are normal. Pupils are equal, round, and reactive to light. Right eye exhibits no discharge. Left eye exhibits no discharge. No scleral icterus.   Neck: Neck supple. No thyromegaly present. No tracheal deviation present. No JVD present.   Normal range of motion.  Cardiovascular:  Normal rate, regular rhythm, normal heart sounds and intact distal pulses.     Exam reveals no gallop and no friction rub.       No murmur heard.  Pulmonary/Chest: Breath sounds normal. No stridor. No respiratory distress. He has no wheezes. He has no rhonchi. He has no rales. He exhibits no tenderness.   Abdominal: Abdomen is soft. Bowel sounds are normal. He exhibits no distension and no mass. There is abdominal tenderness.   Tenderness to palpation in the epigastric region There is no rebound and no guarding.   Musculoskeletal:         General: No tenderness or edema. Normal range of motion.      Cervical back: Normal range of motion and neck supple.     Lymphadenopathy:     He has no cervical adenopathy.   Neurological: He is alert and oriented to person, place, and time. He has normal strength. No cranial nerve deficit or sensory deficit.   Skin: Skin is warm and dry. No rash noted. No erythema.       ED Course   Procedures  Labs Reviewed   CBC W/ AUTO DIFFERENTIAL - Abnormal; Notable for the following components:       Result Value    RBC 4.30 (*)      MCH 33.0 (*)     RDW 11.4 (*)     MPV 8.7 (*)     All other components within normal limits    Narrative:     For upper or mid abdominal pain.   COMPREHENSIVE METABOLIC PANEL - Abnormal; Notable for the following components:    CO2 35 (*)     BUN 32 (*)     Total Protein 9.7 (*)     Albumin 5.3 (*)     All other components within normal limits    Narrative:     For upper or mid abdominal pain.   LIPASE    Narrative:     For upper or mid abdominal pain.          Imaging Results              X-Ray Abdomen Flat And Erect (Final result)  Result time 02/24/23 19:17:37      Final result by Vanessa Guerrero DO (02/24/23 19:17:37)                   Narrative:    ABDOMINAL RADIOGRAPHS: 2/24/2023 7:17 PM CST    COMPARISON: None available    TECHNIQUE: Upright and supine radiographs of the abdomen were obtained.    HISTORY: 32 years  old Male with chest pain, vomiting for a few days.    FINDINGS: The visualized bowel gas pattern is nonspecific and nonobstructive. No free air is seen beneath the hemidiaphragms. No abnormal intraabdominal calcifications are seen. There are no findings to suggest organomegaly. The visualized lung bases appear clear.    IMPRESSION: The bowel gas pattern is nonobstructive and nonspecific.    Electronically signed by:  Vanessa Guerrero DO  2/24/2023 7:17 PM CST Workstation: [id number]                                     X-Ray Chest AP Portable (Final result)  Result time 02/24/23 19:03:33      Final result by Vanessa Guerrero DO (02/24/23 19:03:33)                   Narrative:    AP chest radiograph: 2/24/2023 7:03 PM CST    History: 32 years  old Male with pain.    Comparison: Chest radiograph performed 5/9/2018.    Findings: The cardiomediastinal silhouette is normal in size.    No pneumothorax is seen.    No acute airspace opacities are seen.    No discrete pleural effusion is apparent.    Impression: No acute airspace opacities are seen.      Electronically signed by:  Vanessa Guerrero DO   2/24/2023 7:03 PM [facility] Workstation: [id number]                                     Medications   sodium chloride 0.9% bolus 1,000 mL 1,000 mL (0 mLs Intravenous Stopped 2/24/23 1939)     Medical Decision Making:   History:   Old Medical Records: I decided to obtain old medical records.  Initial Assessment:   Patient has tenderness to palpation of the epigastric region no rebound or guarding no palpable organomegaly differential diagnosis includes pancreatitis, GERD, electrolyte abnormality, endocrine dysfunction          Attending Attestation:             Attending ED Notes:   Patient's labs show no significant acute abnormalities patient's imaging is within normal limits patient will be started on a course of nausea medication and Pepcid patient is advised to return immediately to the ER for any worsening or any further concerns patient is referred to PCP for further evaluation and management.    MDM    Patient presents for emergent evaluation of acute complaint that poses a possible threat to life and/or bodily function.    I may have ordered labs and personally reviewed them. If applicable, Labs significant for abnormalities noted above.    I may have ordered X-rays and personally reviewed them and reviewed the radiologist interpretation.  If applicable, Xray significant for findings noted above.    I may have ordered EKG and personally reviewed it.  If applicable, EKG significant for findings noted above.    I may have ordered CT scan and personally reviewed it and reviewed the radiologist interpretation.  If applicable, CT significant for findings noted above.      I had a detailed discussion with the patient and/or guardian regarding: The historical points, exam findings, and diagnostic results supporting the discharge diagnosis, lab results, pertinent radiology results, and the need for outpatient follow-up, for definitive care with a family practitioner and to return to the emergency department if symptoms  worsen or persist or if there are any questions or concerns that arise at home. All questions have been answered in detail. Strict return to Emergency Department precautions have been provided.     A dictation software program was used for this note.  Please expect some simple typographical  errors in this note.                        Clinical Impression:   Final diagnoses:  [R52] Pain  [K21.9] Gastroesophageal reflux disease, unspecified whether esophagitis present (Primary)        ED Disposition Condition    Discharge Stable          ED Prescriptions       Medication Sig Dispense Start Date End Date Auth. Provider    ondansetron (ZOFRAN-ODT) 4 MG TbDL  (Status: Discontinued) Take 1 tablet (4 mg total) by mouth every 6 (six) hours as needed. 12 tablet 2/24/2023 2/24/2023 Kennedy Hammonds MD    famotidine (PEPCID) 20 MG tablet Take 2 tablets (40 mg total) by mouth 2 (two) times daily. 20 tablet 2/24/2023 2/24/2024 Kennedy Hammonds MD    ondansetron (ZOFRAN-ODT) 4 MG TbDL Take 1 tablet (4 mg total) by mouth every 6 (six) hours as needed. 12 tablet 2/24/2023 -- Kennedy Hammonds MD          Follow-up Information       Follow up With Specialties Details Why Contact Info Additional Information    [facility] - Emergency Dept Emergency Medicine  If symptoms worsen [address] [id number] [phone] 1st floor    [facility] Schedule an appointment as soon as possible for a visit in 2 days  [address]  [address]  [facility] [address]  [phone]                Kennedy Hammonds MD  02/24/23 2004

## 2025-07-21 NOTE — TELEPHONE ENCOUNTER
Dear Referring Provider and Staff,    The Endoscopy Scheduling Department has made 2+ attempts to reach the patient for scheduling their Colonoscopy. All final attempts have been made for this referral, if the referring provider would like the patient to receive endoscopic care, please confirm with the patient whether they would like to proceed. If so, then submit a new referral and inform the Pt that the Endoscopy Scheduling department will be contacting them to schedule their procedure.      Thank you,    Endoscopy Scheduling Department      Unable to leave message due to static on phone.